# Patient Record
Sex: MALE | Race: ASIAN | NOT HISPANIC OR LATINO | URBAN - METROPOLITAN AREA
[De-identification: names, ages, dates, MRNs, and addresses within clinical notes are randomized per-mention and may not be internally consistent; named-entity substitution may affect disease eponyms.]

---

## 2017-09-26 ENCOUNTER — OFFICE VISIT (OUTPATIENT)
Dept: FAMILY MEDICINE | Facility: CLINIC | Age: 47
End: 2017-09-26

## 2017-09-26 VITALS
TEMPERATURE: 98.5 F | HEART RATE: 74 BPM | BODY MASS INDEX: 25.13 KG/M2 | OXYGEN SATURATION: 99 % | DIASTOLIC BLOOD PRESSURE: 74 MMHG | WEIGHT: 176.4 LBS | SYSTOLIC BLOOD PRESSURE: 116 MMHG

## 2017-09-26 DIAGNOSIS — M77.12 LATERAL EPICONDYLITIS, LEFT ELBOW: Primary | ICD-10-CM

## 2017-09-26 PROCEDURE — 99213 OFFICE O/P EST LOW 20 MIN: CPT | Performed by: PHYSICIAN ASSISTANT

## 2017-09-26 NOTE — LETTER
September 26, 2017      Zurdo Navas  [address] [address]        To Whom It May Concern:    Zurdo Navas was seen in our clinic. He may return to work with the following restrictions starting today:     Lifting: limit to 15 lbs.  Twisting Wrist/Elbow - No more than 15x per hour    OK to return to work without restrictions starting October 3rd.      Sincerely,        BRYANT Stevens

## 2017-09-26 NOTE — NURSING NOTE
Zurdo is here for left elbow pain    Pre-Visit Screening :  Immunizations : up to date  Colon Screening : NA  Asthma Action Test/Plan : NA  PHQ9/GAD7 :  NA  Pulse - regular  My Chart - declines    CLASSIFICATION OF OVERWEIGHT AND OBESITY BY BMI                         Obesity Class           BMI(kg/m2)  Underweight                                    < 18.5  Normal                                         18.5-24.9  Overweight                                     25.0-29.9  OBESITY                     I                  30.0-34.9                              II                 35.0-39.9  EXTREME OBESITY             III                >40                             Patient's  BMI Body mass index is 25.13 kg/(m^2).  http://hin.nhlbi.nih.gov/menuplanner/menu.cgi  Questioned patient about current smoking habits.  Pt. quit smoking some time ago.

## 2017-09-26 NOTE — PROGRESS NOTES
SUBJECTIVE:                                                    Zurdo Navas is a 46 year old male who presents to clinic today for the following health issues:            Joint Pain    Onset:     Description:   Location: left elbow  Character: feels  sharp    Intensity: 8/10    Progression of Symptoms: intermittent    Accompanying Signs & Symptoms:  Other symptoms: none    History:   Previous similar pain: no       Precipitating factors:   Trauma or overuse: YES- painting this weekend    Alleviating factors:  Improved by: acetaminophen    Therapies Tried and outcome: APAP, BenGgillian      Pt is right handed        Labs reviewed in EPIC  BP Readings from Last 3 Encounters:   17 116/74   16 108/80   16 116/62    Wt Readings from Last 3 Encounters:   17 80 kg (176 lb 6.4 oz)   16 74.8 kg (165 lb)   16 72.7 kg (160 lb 3.2 oz)                  Patient Active Problem List   Diagnosis     Health Care Home     ACP (advance care planning)     Pure hypercholesterolemia     Past Surgical History:   Procedure Laterality Date     NO HISTORY OF SURGERY         Social History   Substance Use Topics     Smoking status: Former Smoker     Packs/day: 0.50     Years: 1.00     Quit date: 1/10/2006     Smokeless tobacco: Never Used     Alcohol use 0.6 oz/week     1 Standard drinks or equivalent per week     Family History   Problem Relation Age of Onset     Hypertension Mother      Hypertension Father       age 74         No current outpatient prescriptions on file.     Allergies   Allergen Reactions     No Known Allergies      Recent Labs   Lab Test  16   1028  01/14/15   1039  01/14/15   1029   LDL  119  146*   --    HDL  38*  44   --    TRIG  111  104   --    ALT   --    --   26   CR   --    --   1.04   GFRESTIMATED   --    --   86   POTASSIUM   --    --   4.3              OBJECTIVE:   /74 (BP Location: Right arm, Patient Position: Chair, Cuff Size: Adult Large)  Pulse 74  Temp  98.5  F (36.9  C) (Oral)  Wt 80 kg (176 lb 6.4 oz)  SpO2 99%  BMI 25.13 kg/m2   Body mass index is 25.13 kg/(m^2).       Inspection: no swelling, no ecchymosis, no olecranon bursa swelling, no distal bicep tendon defect  Tender: extensor muscle of forearm  Non-tender: lateral epicondyle, common extensor tendon, medial epicondyle, flexor muscle of forearm, supracondylar notch, olecranon bursa, distal bicep tendon and radial head/neck  Range of Motion: all normal  Strength: elbow strength full  Special tests: not done       ASSESSMENT/PLAN:                                                        ICD-10-CM    1. Lateral epicondylitis, left elbow M77.12        Exercises from [employer] provided to be completed 1-2 times daily, as tolerated.   Ibuprofen 600 mg qid with food.  Icing advised  Relative rest.  RTC if not improving for xray in 2-3 weeks      Demetria Wells PA-C  [facility]

## 2017-09-26 NOTE — MR AVS SNAPSHOT
After Visit Summary   9/26/2017    Zurdo Navas    MRN: [id number]           Patient Information     Date Of Birth          1970        Visit Information        Provider Department      9/26/2017 9:15 AM Demetria Wells PA [facility]        Today's Diagnoses     Lateral epicondylitis, left elbow    -  1       Follow-ups after your visit        Who to contact     If you have questions or need follow up information about today's clinic visit or your schedule please contact [facility] directly at [phone].  Normal or non-critical lab and imaging results will be communicated to you by MyChart, letter or phone within 4 business days after the clinic has received the results. If you do not hear from us within 7 days, please contact the clinic through MyChart or phone. If you have a critical or abnormal lab result, we will notify you by phone as soon as possible.  Submit refill requests through [employer] or call your pharmacy and they will forward the refill request to us. Please allow 3 business days for your refill to be completed.          Additional Information About Your Visit        Care EveryWhere ID     This is your Care EveryWhere ID. This could be used by other organizations to access your [facility] medical records  [id number]        Your Vitals Were     Pulse Temperature Pulse Oximetry BMI (Body Mass Index)          74 98.5  F (36.9  C) (Oral) 99% 25.13 kg/m2         Blood Pressure from Last 3 Encounters:   09/26/17 116/74   11/28/16 108/80   04/29/16 116/62    Weight from Last 3 Encounters:   09/26/17 80 kg (176 lb 6.4 oz)   11/28/16 74.8 kg (165 lb)   04/29/16 72.7 kg (160 lb 3.2 oz)              Today, you had the following     No orders found for display       Primary Care Provider Office Phone # Fax #    BRYANT Stevens [phone] [address]  [facility] [address]        Equal  Access to Services     [facility]: Hadii aad ku hadangellaashlie Morris, waadriannedemi pantoja, lisatripp morochoivonzeny koch. So [facility] [phone].    ATENCIÓN: Si habla español, tiene a gastelum disposición servicios gratuitos de asistencia lingüística. Llame al [phone].    We comply with applicable federal civil rights laws and [place] laws. We do not discriminate on the basis of race, color, national origin, age, disability sex, sexual orientation or gender identity.            Thank you!     Thank you for choosing [facility]  for your care. Our goal is always to provide you with excellent care. Hearing back from our patients is one way we can continue to improve our services. Please take a few minutes to complete the written survey that you may receive in the mail after your visit with us. Thank you!             Your Updated Medication List - Protect others around you: Learn how to safely use, store and throw away your medicines at www.disposemymeds.org.      Notice  As of 9/26/2017 12:47 PM    You have not been prescribed any medications.

## 2018-05-12 ENCOUNTER — APPOINTMENT (OUTPATIENT)
Dept: GENERAL RADIOLOGY | Facility: CLINIC | Age: 48
End: 2018-05-12
Attending: PHYSICIAN ASSISTANT
Payer: COMMERCIAL

## 2018-05-12 ENCOUNTER — HOSPITAL ENCOUNTER (EMERGENCY)
Facility: CLINIC | Age: 48
Discharge: HOME | End: 2018-05-12
Attending: PHYSICIAN ASSISTANT | Admitting: PHYSICIAN ASSISTANT
Payer: COMMERCIAL

## 2018-05-12 VITALS
BODY MASS INDEX: 25.05 KG/M2 | HEIGHT: 70 IN | SYSTOLIC BLOOD PRESSURE: 133 MMHG | TEMPERATURE: 97.9 F | WEIGHT: 175 LBS | DIASTOLIC BLOOD PRESSURE: 98 MMHG | RESPIRATION RATE: 16 BRPM | OXYGEN SATURATION: 99 %

## 2018-05-12 DIAGNOSIS — S61.217A LACERATION OF LEFT LITTLE FINGER WITHOUT FOREIGN BODY WITHOUT DAMAGE TO NAIL, INITIAL ENCOUNTER: ICD-10-CM

## 2018-05-12 PROCEDURE — 73140 X-RAY EXAM OF FINGER(S): CPT | Mod: LT

## 2018-05-12 PROCEDURE — 99283 EMERGENCY DEPT VISIT LOW MDM: CPT

## 2018-05-12 PROCEDURE — 12001 RPR S/N/AX/GEN/TRNK 2.5CM/<: CPT

## 2018-05-12 RX ORDER — LIDOCAINE HYDROCHLORIDE 10 MG/ML
INJECTION, SOLUTION INFILTRATION; PERINEURAL
Status: DISCONTINUED
Start: 2018-05-12 | End: 2018-05-12 | Stop reason: HOSPADM

## 2018-05-12 ASSESSMENT — ENCOUNTER SYMPTOMS
WOUND: 1
NUMBNESS: 1

## 2018-05-12 NOTE — DISCHARGE INSTRUCTIONS
Sutures out in 10 days.     Extremity Laceration: Stitches, Staples, or Tape  A laceration is a cut through the skin. If it is deep, it may require stitches or staples to close so it can heal. Minor cuts may be treated with surgical tape closures, or skin glue.  X-rays may be done if something may have entered the skin through the cut. You may also need a tetanus shot if you are not up to date on this vaccine.  Home care    Follow the healthcare provider s instructions on how to care for the cut.    Wash your hands with soap and warm water before and after caring for your wound. This is to help prevent infection.    Keep the wound clean and dry. If a bandage was applied and it becomes wet or dirty, replace it. Otherwise, leave it in place for the first 24 hours, then change it once a day or as directed.    If stitches or staples were used, clean the wound daily:  ? After removing the bandage, wash the area with soap and water. Use a wet cotton swab to loosen and remove any blood or crust that forms.  ? After cleaning, keep the wound clean and dry. Talk with your healthcare provider before putting any antibiotic ointment on the wound. Reapply the bandage.    You may remove the bandage to shower as usual after the first 24 hours, but don't soak the area in water (no swimming) until the stitches or staples are removed.    If surgical tape closures were used, keep the area clean and dry. If it becomes wet, blot it dry with a towel. Let the surgical tape fall off on its own.    The healthcare provider may prescribe an antibiotic cream or ointment to prevent infection. He or she may also prescribe an antibiotic pill. Don't stop taking this medicine until you have finished it all or the provider tells you to stop.    The provider may also prescribe medicine for pain. Follow the instructions for taking these medicines.    Don't do activities that may reopen your wound.  Follow-up care  Follow up with your healthcare  provider, or as advised. Most skin wounds heal within 10 days. But an infection may sometimes occur even with proper treatment. Check the wound daily for the signs of infection listed below. Stitches and staples should be removed within 7 to14 days. If surgical tape closures were used, you may remove them after 10 days if they have not fallen off by then.   When to seek medical advice  Call your healthcare provider right away if any of these occur:    Wound bleeding not controlled by direct pressure    Signs of infection, including increasing pain in the wound, increasing wound redness or swelling, or pus or bad odor coming from the wound    Fever of 100.4 F (38 C) or higher, or as directed by your healthcare provider    Stitches or staples come apart or fall out or surgical tape falls off before 7 days    Wound edges reopen    Wound changes colors    Numbness occurs around the wound     Decreased movement around the injured area  Date Last Reviewed: 7/1/2017 2000-2017 [employer]. [address] [place] [address], [place] [address]. All rights reserved. This information is not intended as a substitute for professional medical care. Always follow your healthcare professional's instructions.

## 2018-05-12 NOTE — ED TRIAGE NOTES
A&Ox4. ABC's intact. Pt c/o left 5th finger after he drilled into the side of it.  Denies pain. Sent from [facility].  States tetanus was 3 years ago.

## 2018-05-12 NOTE — ED AVS SNAPSHOT
[facility] Emergency Department    [address]    [facility] [address]    Phone:  [phone]    Fax:  [phone]                                       Zurdo Navas   MRN: [id number]    Department:  [facility] Emergency Department   Date of Visit:  5/12/2018           Patient Information     Date Of Birth          1970        Your diagnoses for this visit were:     Laceration of left little finger without foreign body without damage to nail, initial encounter        You were seen by Savannah Colorado PA-C.      Follow-up Information     Follow up with Demetria Wells PA In 10 days.    Specialty:  Family Practice    Why:  For suture removal, For wound re-check    Contact information:    [address] [facility] [address]  [phone]          Follow up with [facility].    Specialty:  EMERGENCY MEDICINE    Why:  If symptoms worsen    Contact information:    [facility] [address] [phone]        Discharge Instructions       Sutures out in 10 days.     Extremity Laceration: Stitches, Staples, or Tape  A laceration is a cut through the skin. If it is deep, it may require stitches or staples to close so it can heal. Minor cuts may be treated with surgical tape closures, or skin glue.  X-rays may be done if something may have entered the skin through the cut. You may also need a tetanus shot if you are not up to date on this vaccine.  Home care    Follow the healthcare provider s instructions on how to care for the cut.    Wash your hands with soap and warm water before and after caring for your wound. This is to help prevent infection.    Keep the wound clean and dry. If a bandage was applied and it becomes wet or dirty, replace it. Otherwise, leave it in place for the first 24 hours, then change it once a day or as directed.    If stitches or staples were used, clean the  wound daily:  ? After removing the bandage, wash the area with soap and water. Use a wet cotton swab to loosen and remove any blood or crust that forms.  ? After cleaning, keep the wound clean and dry. Talk with your healthcare provider before putting any antibiotic ointment on the wound. Reapply the bandage.    You may remove the bandage to shower as usual after the first 24 hours, but don't soak the area in water (no swimming) until the stitches or staples are removed.    If surgical tape closures were used, keep the area clean and dry. If it becomes wet, blot it dry with a towel. Let the surgical tape fall off on its own.    The healthcare provider may prescribe an antibiotic cream or ointment to prevent infection. He or she may also prescribe an antibiotic pill. Don't stop taking this medicine until you have finished it all or the provider tells you to stop.    The provider may also prescribe medicine for pain. Follow the instructions for taking these medicines.    Don't do activities that may reopen your wound.  Follow-up care  Follow up with your healthcare provider, or as advised. Most skin wounds heal within 10 days. But an infection may sometimes occur even with proper treatment. Check the wound daily for the signs of infection listed below. Stitches and staples should be removed within 7 to14 days. If surgical tape closures were used, you may remove them after 10 days if they have not fallen off by then.   When to seek medical advice  Call your healthcare provider right away if any of these occur:    Wound bleeding not controlled by direct pressure    Signs of infection, including increasing pain in the wound, increasing wound redness or swelling, or pus or bad odor coming from the wound    Fever of 100.4 F (38 C) or higher, or as directed by your healthcare provider    Stitches or staples come apart or fall out or surgical tape falls off before 7 days    Wound edges reopen    Wound changes  colors    Numbness occurs around the wound     Decreased movement around the injured area  Date Last Reviewed: 7/1/2017 2000-2017 [employer]. [address] [place] [address], [place] [address]. All rights reserved. This information is not intended as a substitute for professional medical care. Always follow your healthcare professional's instructions.          24 Hour Appointment Hotline       To make an appointment at any [facility], call [phone] ([phone]). If you don't have a family doctor or clinic, we will help you find one. [facility] are conveniently located to serve the needs of you and your family.             Review of your medicines      Notice     You have not been prescribed any medications.            Procedures and tests performed during your visit     Fingers XR, 2-3 views, left      Orders Needing Specimen Collection     None      Pending Results     Date and Time Order Name Status Description    5/12/2018 1721 Fingers XR, 2-3 views, left Preliminary             Pending Culture Results     No orders found from 5/10/2018 to 5/13/2018.            Pending Results Instructions     If you had any lab results that were not finalized at the time of your Discharge, you can call the ED Lab Result RN at [phone]. You will be contacted by this team for any positive Lab results or changes in treatment. The nurses are available 7 days a week from 10A to 6:30P.  You can leave a message 24 hours per day and they will return your call.        Test Results From Your Hospital Stay        5/12/2018  6:09 PM      Narrative     LEFT FINGER TWO OR MORE VIEWS  5/12/2018 6:03 PM     HISTORY: Battery powered drill to pinky finger, rule out fracture.         Impression     IMPRESSION: Fifth finger soft tissue laceration and soft tissue gas.  No radiopaque foreign body. No apparent fracture or osseous  destruction.                 Clinical Quality Measure: Blood Pressure Screening     Your  "blood pressure was checked while you were in the emergency department today. The last reading we obtained was  BP: (!) 133/98 . Please read the guidelines below about what these numbers mean and what you should do about them.  If your systolic blood pressure (the top number) is less than 120 and your diastolic blood pressure (the bottom number) is less than 80, then your blood pressure is normal. There is nothing more that you need to do about it.  If your systolic blood pressure (the top number) is 120-139 or your diastolic blood pressure (the bottom number) is 80-89, your blood pressure may be higher than it should be. You should have your blood pressure rechecked within a year by a primary care provider.  If your systolic blood pressure (the top number) is 140 or greater or your diastolic blood pressure (the bottom number) is 90 or greater, you may have high blood pressure. High blood pressure is treatable, but if left untreated over time it can put you at risk for heart attack, stroke, or kidney failure. You should have your blood pressure rechecked by a primary care provider within the next 4 weeks.  If your provider in the emergency department today gave you specific instructions to follow-up with your doctor or provider even sooner than that, you should follow that instruction and not wait for up to 4 weeks for your follow-up visit.        Thank you for choosing [facility]       Thank you for choosing [facility] for your care. Our goal is always to provide you with excellent care. Hearing back from our patients is one way we can continue to improve our services. Please take a few minutes to complete the written survey that you may receive in the mail after you visit with us. Thank you!        [employer] Information     [employer] lets you send messages to your doctor, view your test results, renew your prescriptions, schedule appointments and more. To sign up, go to [email or web address] . Click on \"Log in\" on the " "left side of the screen, which will take you to the Welcome page. Then click on \"Sign up Now\" on the right side of the page.     You will be asked to enter the access code listed below, as well as some personal information. Please follow the directions to create your username and password.     Your access code is: [email or web address]  Expires: 8/10/2018  6:46 PM     Your access code will  in 90 days. If you need help or a new code, please call your [facility] clinic or [phone].        Care EveryWhere ID     This is your Care EveryWhere ID. This could be used by other organizations to access your [facility] medical records  [id number]        Equal Access to Services     [facility] : Chica Morris, bart pantoja, lokesh steiner, zeny hernandez. So [facility] [phone].    ATENCIÓN: Si habla español, tiene a gastelum disposición servicios gratuitos de asistencia lingüística. Llame al [phone].    We comply with applicable federal civil rights laws and [place] laws. We do not discriminate on the basis of race, color, national origin, age, disability, sex, sexual orientation, or gender identity.            After Visit Summary       This is your record. Keep this with you and show to your community pharmacist(s) and doctor(s) at your next visit.                  "

## 2018-05-12 NOTE — ED AVS SNAPSHOT
[facility] Emergency Department    [address]    [facility] [address]    Phone:  [phone]    Fax:  [phone]                                       Zurdo Navas   MRN: [id number]    Department:  [facility] Emergency Department   Date of Visit:  5/12/2018           After Visit Summary Signature Page     I have received my discharge instructions, and my questions have been answered. I have discussed any challenges I see with this plan with the nurse or doctor.    ..........................................................................................................................................  Patient/Patient Representative Signature      ..........................................................................................................................................  Patient Representative Print Name and Relationship to Patient    ..................................................               ................................................  Date                                            Time    ..........................................................................................................................................  Reviewed by Signature/Title    ...................................................              ..............................................  Date                                                            Time

## 2018-05-12 NOTE — ED PROVIDER NOTES
"  History     Chief Complaint:  Laceration    HPI   Zurdo Navas is a 47 year old male with a history of hyperlipidemia who presents with a laceration. The patient reports he was using a drill to drill through a piece of wood and accidentally drilled into the side of his left pinky finger. He states it was bleeding a lot and he had significant pain, so he went to urgent care for evaluation and was subsequently sent to the ED. He reports he can bend his finger but does have some mild numbness. He denies any other injury. His tetanus vaccine is up to date.     Allergies:  No known drug allergies     Medications:    The patient is currently on no regular medications.     Past Medical History:    Hyperlipidemia    Past Surgical History:    History reviewed. No pertinent surgical history.     Family History:    Hypertension     Social History:  Smoking status: Former smoker, quit 2006  Alcohol use: Yes   Marital Status:   [4]     Review of Systems   Skin: Positive for wound (left pinky finger laceration).   Neurological: Positive for numbness.   All other systems reviewed and are negative.    Physical Exam     Patient Vitals for the past 24 hrs:   BP Temp Temp src Heart Rate Resp SpO2 Height Weight   05/12/18 1710 (!) 133/98 97.9  F (36.6  C) Temporal 82 16 99 % 1.778 m (5' 10\") 79.4 kg (175 lb)      Physical Exam  Constitutional:  Alert, attentive  HENT:    Nose: Nose normal.    Mouth/Throat: Oropharynx is clear, mucous membranes are moist  Eyes:    EOM are normal. Pupils are equal, round, and reactive to light.   CV:    regular rate and rhythm; no murmurs, rubs or gallups. 2+ radial and ulnar pulses to the bilateral upper extremities   Chest:   Effort normal and breath sounds normal.   GI:     There is no tenderness. No distension. Normal bowel sounds  Neurological:  5/5 strength to the radian, ulnar and median motor distributions;      sensation intact to light touch to the radian, ulnar and median " distributions  MSK:   There is a 2 cm laceration to the left proximal 5th finger. Normal ROM of the fingers. No other injuries.   Skin:    Skin is warm and dry.      Emergency Department Course   Imaging:  Radiographic findings were communicated with the patient who voiced understanding of the findings.    Fingers XR, 2-3 Views, Left:  Fifth finger soft tissue laceration and soft tissue gas.  No radiopaque foreign body. No apparent fracture or osseous  destruction.   As read by Radiology.     Procedures:    Narrative: Procedure: Laceration Repair      PERFORMED BY: Nati Ontiveros PA-C        LACERATION:  A simple clean 2 cm laceration.      LOCATION:  Left 5th finger      FUNCTION:  Distally sensation, circulation, motor and tendon function are intact.      ANESTHESIA:  Local using 1% lidocaine total of 4 mLs      PREPARATION:  Irrigation and Scrubbing with Shur Clens      DEBRIDEMENT:  no debridement      CLOSURE:  Wound was closed with One Layer.  Skin closed with 5 x 4.0 Ethylon using interrupted sutures.     Emergency Department Course:  Past medical records, nursing notes, and vitals reviewed.  1716: I performed an exam of the patient and obtained history, as documented above.   The patient was sent for a left finger x-ray while in the emergency department, findings above.     1815: Nati Ontiveros PA-C repaired the patient's laceration, as noted above.     1820: I rechecked the patient. Findings and plan explained to the Patient. Patient discharged home with instructions regarding supportive care, medications, and reasons to return. The importance of close follow-up was reviewed.      Impression & Plan    Medical Decision Making:  Zurdo Navas is a 47 year old male who presents for evaluation of a complex finger laceration after drilling into the side of his left th finger.  X-ray reveals no fracture or foreign body. Tetanus up to date.  The finger wound was closed, as noted above.  There was no exposed  bone/tendon/joint after laceration repair. There is no signs at this point of tendon injury.  Sensation is intact distally in that finger.  Bacitracin was applied and dressing change instructions given to patient.  No other injury. Sutures out in 10 days.      Diagnosis:    ICD-10-CM   1. Laceration of left little finger without foreign body without damage to nail, initial encounter S61.217A     Disposition:  discharged to home    Viry Bey  5/12/2018   [facility] EMERGENCY DEPARTMENT    I, Viry Bey, am serving as a scribe at 5:16 PM on 5/12/2018 to document services personally performed by Savannah Colorado PA-C based on my observations and the provider's statements to me.       Savannah Colorado PA-C  05/12/18 2011

## 2018-05-13 NOTE — ED PROVIDER NOTES
I, Nati Ontiveros PA-C, interviewed the patient, explained the course of action and discussed the patient with Savannah Colorado PA-C, who then evaluated the patient.    I performed the below laceration repair on this patient.       Narrative: Procedure: Laceration Repair        LACERATION:  A simple, semicircular, minimally contaminated 2 cm laceration.      LOCATION: Left proximal fifth digit      FUNCTION:  Distally sensation, circulation, motor and tendon function are intact.      ANESTHESIA:  Local using 1% lidocaine, total of 4 mLs      PREPARATION:  Irrigation and Scrubbing with Normal Saline and Shur Clens      DEBRIDEMENT:  Minimal debridement      CLOSURE:  Wound was closed with One Layer. Skin closed with 5 x 4.0 Ethylon using interrupted sutures.         Nati Ontiveros PA-C  05/12/18 2006

## 2018-05-25 ENCOUNTER — OFFICE VISIT (OUTPATIENT)
Dept: FAMILY MEDICINE | Facility: CLINIC | Age: 48
End: 2018-05-25

## 2018-05-25 VITALS
WEIGHT: 180.8 LBS | DIASTOLIC BLOOD PRESSURE: 88 MMHG | TEMPERATURE: 97.9 F | HEART RATE: 79 BPM | BODY MASS INDEX: 25.88 KG/M2 | HEIGHT: 70 IN | OXYGEN SATURATION: 96 % | SYSTOLIC BLOOD PRESSURE: 128 MMHG

## 2018-05-25 DIAGNOSIS — S61.217D LACERATION OF LEFT LITTLE FINGER WITHOUT FOREIGN BODY WITHOUT DAMAGE TO NAIL, SUBSEQUENT ENCOUNTER: Primary | ICD-10-CM

## 2018-05-25 DIAGNOSIS — Z48.02 ENCOUNTER FOR REMOVAL OF SUTURES: ICD-10-CM

## 2018-05-25 PROBLEM — S61.217A LACERATION OF LEFT LITTLE FINGER WITHOUT FOREIGN BODY WITHOUT DAMAGE TO NAIL: Status: ACTIVE | Noted: 2018-05-25

## 2018-05-25 PROBLEM — S61.217A LACERATION OF LEFT LITTLE FINGER WITHOUT FOREIGN BODY WITHOUT DAMAGE TO NAIL: Status: RESOLVED | Noted: 2018-05-25 | Resolved: 2018-05-25

## 2018-05-25 PROCEDURE — 99213 OFFICE O/P EST LOW 20 MIN: CPT | Performed by: PHYSICIAN ASSISTANT

## 2018-05-25 NOTE — MR AVS SNAPSHOT
After Visit Summary   5/25/2018    Zurdo Navas    MRN: [id number]           Patient Information     Date Of Birth          1970        Visit Information        Provider Department      5/25/2018 9:15 AM Demetria Wells PA [facility]        Today's Diagnoses     Laceration of left little finger without foreign body without damage to nail, subsequent encounter    -  1    Encounter for removal of sutures           Follow-ups after your visit        Additional Services     ORTHOPEDICS ADULT REFERRAL       Your provider has referred you to: [facility] [phone]   [email or web address]    Please be aware that coverage of these services is subject to the terms and limitations of your health insurance plan.  Call member services at your health plan with any benefit or coverage questions.      Please bring the following to your appointment:    >>   Any x-rays, CTs or MRIs which have been performed.  Contact the facility where they were done to arrange for  prior to your scheduled appointment.  Any new CT, MRI or other procedures ordered by your specialist must be performed at a [facility] or coordinated by your clinic's referral office.    >>   List of current medications   >>   This referral request   >>   Any documents/labs given to you for this referral                  Who to contact     If you have questions or need follow up information about today's clinic visit or your schedule please contact [facility] directly at [phone].  Normal or non-critical lab and imaging results will be communicated to you by MyChart, letter or phone within 4 business days after the clinic has received the results. If you do not hear from us within 7 days, please contact the clinic through MyChart or phone. If you have a critical or abnormal lab result, we will notify you by phone as soon  "as possible.  Submit refill requests through [employer] or call your pharmacy and they will forward the refill request to us. Please allow 3 business days for your refill to be completed.          Additional Information About Your Visit        [employer] Information     [employer] lets you send messages to your doctor, view your test results, renew your prescriptions, schedule appointments and more. To sign up, go to [email or web address] . Click on \"Log in\" on the left side of the screen, which will take you to the Welcome page. Then click on \"Sign up Now\" on the right side of the page.     You will be asked to enter the access code listed below, as well as some personal information. Please follow the directions to create your username and password.     Your access code is: [email or web address]  Expires: 8/10/2018  6:46 PM     Your access code will  in 90 days. If you need help or a new code, please call your [place] clinic or [phone].        Care EveryWhere ID     This is your Care EveryWhere ID. This could be used by other organizations to access your [place] medical records  [id number]        Your Vitals Were     Pulse Temperature Height Pulse Oximetry BMI (Body Mass Index)       79 97.9  F (36.6  C) (Oral) 1.778 m (5' 10\") 96% 25.94 kg/m2        Blood Pressure from Last 3 Encounters:   18 128/88   18 (!) 133/98   17 116/74    Weight from Last 3 Encounters:   18 82 kg (180 lb 12.8 oz)   18 79.4 kg (175 lb)   17 80 kg (176 lb 6.4 oz)              We Performed the Following     ORTHOPEDICS ADULT REFERRAL        Primary Care Provider Office Phone # Fax #    BRYANT Stevens [phone] [address]  [facility] [address]        Equal Access to Services     KRYS ROBERTO AH: Chica Morris, wamehrdad pantoja, zeny ulloa ah. [facility] [phone].    ATENCIÓN: Si magali smith, " tiene a gastelum disposición servicios gratuitos de asistencia lingüística. Nani hdz [phone].    We comply with applicable federal civil rights laws and [place] laws. We do not discriminate on the basis of race, color, national origin, age, disability, sex, sexual orientation, or gender identity.            Thank you!     Thank you for choosing [facility]  for your care. Our goal is always to provide you with excellent care. Hearing back from our patients is one way we can continue to improve our services. Please take a few minutes to complete the written survey that you may receive in the mail after your visit with us. Thank you!             Your Updated Medication List - Protect others around you: Learn how to safely use, store and throw away your medicines at www.disposemymeds.org.      Notice  As of 5/25/2018 11:59 PM    You have not been prescribed any medications.

## 2018-05-25 NOTE — PROGRESS NOTES
"Suture removal  Chief Complaint   Patient presents with     Suture Removal     pt has sutures in left 5th digit to be removed       Subjective:  Zurdo Navas who presents for suture removal.  Sitches were placed on 5/12/18 following trauma.  There have not been any problems with the suture site.    Pt is right handed      Today I had a PA student with me Hipolito Mccarthy who participated in history and PE of the patient, patient agreeable to this.     Objective:  /88 (BP Location: Left arm, Patient Position: Chair, Cuff Size: Adult Large)  Pulse 79  Temp 97.9  F (36.6  C) (Oral)  Ht 1.778 m (5' 10\")  Wt 82 kg (180 lb 12.8 oz)  SpO2 96%  BMI 25.94 kg/m2     There are 5 sutures in the left hand.  The site appears well healed.  The stitches were removed without difficulty. (PA student removed 2 sutures and I removed the other 3).  Steri-strips were not applied at this time.    ROM of the finger decreased slightly    Assessment:  Suture Removal    Plan  1. Apply anti-bacterial ointment for 7 days  2. RTC for any problems/concerns otherwise on a PRN basis    See ortho of ROM not improving 1-2 weeks    Demetria Wells    "

## 2018-05-25 NOTE — NURSING NOTE
Zurdo is here for a suture removal of his left 5th digit    Pre-Visit Screening :  Immunizations : up to date  Colon Screening : randolph  Asthma Action Test/Plan : randolph  PHQ9/GAD7 :  Na    Pulse - regular  My Chart - declines    CLASSIFICATION OF OVERWEIGHT AND OBESITY BY BMI                         Obesity Class           BMI(kg/m2)  Underweight                                    < 18.5  Normal                                         18.5-24.9  Overweight                                     25.0-29.9  OBESITY                     I                  30.0-34.9                              II                 35.0-39.9  EXTREME OBESITY             III                >40                             Patient's  BMI Body mass index is 22.15 kg/(m^2).  http://hin.nhlbi.nih.gov/menuplanner/menu.cgi  Questioned patient about current smoking habits.  Pt. has never smoked.  The patient has verbalized that it is ok to leave a detailed voice message on the patient's cell phone with results/recommendations from this visit.       Verified [phone] phone number:

## 2018-08-13 ENCOUNTER — OFFICE VISIT (OUTPATIENT)
Dept: FAMILY MEDICINE | Facility: CLINIC | Age: 48
End: 2018-08-13

## 2018-08-13 VITALS
HEART RATE: 72 BPM | BODY MASS INDEX: 26.05 KG/M2 | OXYGEN SATURATION: 98 % | WEIGHT: 182 LBS | DIASTOLIC BLOOD PRESSURE: 82 MMHG | HEIGHT: 70 IN | TEMPERATURE: 98 F | SYSTOLIC BLOOD PRESSURE: 114 MMHG

## 2018-08-13 DIAGNOSIS — Z00.01 ENCOUNTER FOR ROUTINE ADULT HEALTH EXAMINATION WITH ABNORMAL FINDINGS: Primary | ICD-10-CM

## 2018-08-13 DIAGNOSIS — E66.3 OVERWEIGHT: ICD-10-CM

## 2018-08-13 DIAGNOSIS — L30.9 DERMATITIS: ICD-10-CM

## 2018-08-13 LAB — GLUCOSE SERPL-MCNC: 100 MG/DL (ref 60–99)

## 2018-08-13 PROCEDURE — 36415 COLL VENOUS BLD VENIPUNCTURE: CPT | Performed by: PHYSICIAN ASSISTANT

## 2018-08-13 PROCEDURE — 80061 LIPID PANEL: CPT | Mod: 90 | Performed by: PHYSICIAN ASSISTANT

## 2018-08-13 PROCEDURE — 82947 ASSAY GLUCOSE BLOOD QUANT: CPT | Performed by: PHYSICIAN ASSISTANT

## 2018-08-13 PROCEDURE — 99396 PREV VISIT EST AGE 40-64: CPT | Performed by: PHYSICIAN ASSISTANT

## 2018-08-13 RX ORDER — HYDROCORTISONE VALERATE 2 MG/G
OINTMENT TOPICAL
Qty: 45 G | Refills: 0 | Status: SHIPPED | OUTPATIENT
Start: 2018-08-13 | End: 2020-07-10

## 2018-08-13 NOTE — PROGRESS NOTES
SUBJECTIVE:   CC: Zurdo Navas is an 47 year old male who presents for preventative health visit.     Healthy Habits:    Do you get at least three servings of calcium containing foods daily (dairy, green leafy vegetables, etc.)? yes    Amount of exercise or daily activities, outside of work: none    Problems taking medications regularly No    Medication side effects: No    Have you had an eye exam in the past two years? yes    Do you see a dentist twice per year? yes    Do you have sleep apnea, excessive snoring or daytime drowsiness?no      Wt Readings from Last 5 Encounters:   08/13/18 82.6 kg (182 lb)   05/25/18 82 kg (180 lb 12.8 oz)   05/12/18 79.4 kg (175 lb)   09/26/17 80 kg (176 lb 6.4 oz)   11/28/16 74.8 kg (165 lb)     Rash - developed 3 days ago  New soap  andNew laundry detergent as well  in the last week.  Using a friends Momematsone which has helped    -------------------------------------    Today's PHQ-2 Score:   PHQ-2 ( 1999 Pfizer) 8/13/2018 1/14/2015   Q1: Little interest or pleasure in doing things 0 0   Q2: Feeling down, depressed or hopeless 0 0   PHQ-2 Score 0 0       Abuse: Current or Past(Physical, Sexual or Emotional)- No  Do you feel safe in your environment - Yes    Social History   Substance Use Topics     Smoking status: Former Smoker     Packs/day: 0.50     Years: 1.00     Quit date: 1/10/2006     Smokeless tobacco: Never Used     Alcohol use 0.6 oz/week     1 Standard drinks or equivalent per week      Comment: occasional      If you drink alcohol do you typically have >3 drinks per day or >7 drinks per week? No                      Last PSA: No results found for: PSA    Reviewed orders with patient. Reviewed health maintenance and updated orders accordingly - Yes  Labs reviewed in EPIC  BP Readings from Last 3 Encounters:   08/13/18 114/82   05/25/18 128/88   05/12/18 (!) 133/98    Wt Readings from Last 3 Encounters:   08/13/18 82.6 kg (182 lb)   05/25/18 82 kg (180 lb 12.8 oz)    18 79.4 kg (175 lb)                  Patient Active Problem List   Diagnosis     Health Care Home     ACP (advance care planning)     Pure hypercholesterolemia     Past Surgical History:   Procedure Laterality Date     NO HISTORY OF SURGERY         Social History   Substance Use Topics     Smoking status: Former Smoker     Packs/day: 0.50     Years: 1.00     Quit date: 1/10/2006     Smokeless tobacco: Never Used     Alcohol use 0.6 oz/week     1 Standard drinks or equivalent per week     Family History   Problem Relation Age of Onset     Hypertension Mother      Hypertension Father       age 74     Other - See Comments Daughter      cervix hx         Current Outpatient Prescriptions   Medication Sig Dispense Refill     hydrocortisone valerate (WEST-SAWYER) 0.2 % ointment Apply sparingly to affected area three times daily as needed. 45 g 0     Allergies   Allergen Reactions     No Known Allergies        Reviewed and updated as needed this visit by clinical staff  Tobacco  Allergies  Problems         Reviewed and updated as needed this visit by Provider  Problems        No past medical history on file.   Past Surgical History:   Procedure Laterality Date     NO HISTORY OF SURGERY         ROS:  CONSTITUTIONAL: NEGATIVE for fever, chills, change in weight  INTEGUMENTARY/SKIN: See rash info above  EYES: NEGATIVE for vision changes or irritation  ENT: NEGATIVE for ear, mouth and throat problems  RESP: NEGATIVE for significant cough or SOB  CV: NEGATIVE for chest pain, palpitations or peripheral edema  GI: NEGATIVE for nausea, abdominal pain, heartburn, or change in bowel habits   male: negative for dysuria, hematuria, decreased urinary stream, erectile dysfunction, urethral discharge  MUSCULOSKELETAL: NEGATIVE for significant arthralgias or myalgia  NEURO: NEGATIVE for weakness, dizziness or paresthesias  PSYCHIATRIC: NEGATIVE for changes in mood or affect    OBJECTIVE:   /82 (BP Location: Right arm,  "Patient Position: Sitting, Cuff Size: Adult Large)  Pulse 72  Temp 98  F (36.7  C) (Oral)  Ht 1.778 m (5' 10\")  Wt 82.6 kg (182 lb)  SpO2 98%  BMI 26.11 kg/m2    EXAM:  GENERAL: healthy, alert and no distress  EYES: Eyes grossly normal to inspection, PERRL and conjunctivae and sclerae normal  HENT: ear canals and TM's normal, nose and mouth without ulcers or lesions  NECK: no adenopathy, no asymmetry, masses, or scars and thyroid normal to palpation  RESP: lungs clear to auscultation - no rales, rhonchi or wheezes  CV: regular rate and rhythm, normal S1 S2, no S3 or S4, no murmur, click or rub, no peripheral edema and peripheral pulses strong  ABDOMEN: soft, nontender, no hepatosplenomegaly, no masses and bowel sounds normal   (male): normal male genitalia without lesions or urethral discharge, no hernia  MS: no gross musculoskeletal defects noted, no edema  SKIN: papular rash on lower back and lower legs- discrete 2 mm papules with erythematous base  NEURO: Normal strength and tone, mentation intact and speech normal  PSYCH: mentation appears normal, affect normal/bright    Diagnostic Test Results:  none     ASSESSMENT/PLAN:   (Z00.01) Encounter for routine adult health examination with abnormal findings  (primary encounter diagnosis)  Comment: annual  Plan: VENOUS COLLECTION, Lipid Profile, Glucose         Fasting (BFP)            (L30.9) Dermatitis  Comment: new  Plan: hydrocortisone valerate (WEST-SAWYER) 0.2 %         ointment        Start mid potency steroid tid up to 14 days  Zyrtec and Benadryl nightly    OVERWEIGHT      COUNSELING:  Special attention given to:        Regular exercise       Healthy diet/nutrition       Vision screening    BP Readings from Last 1 Encounters:   08/13/18 114/82     Estimated body mass index is 26.11 kg/(m^2) as calculated from the following:    Height as of this encounter: 1.778 m (5' 10\").    Weight as of this encounter: 82.6 kg (182 lb).      Weight management plan: " Discussed healthy diet and exercise guidelines and patient will follow up in 12 months in clinic to re-evaluate.     reports that he quit smoking about 12 years ago. He has a 0.50 pack-year smoking history. He has never used smokeless tobacco.      Counseling Resources:  ATP IV Guidelines  Pooled Cohorts Equation Calculator  FRAX Risk Assessment  ICSI Preventive Guidelines  Dietary Guidelines for Americans, 2010  USDA's MyPlate  ASA Prophylaxis  Lung CA Screening    BRYANT Stevens  [facility]

## 2018-08-13 NOTE — NURSING NOTE
Zurdo is here for CPX fasting      Patient is here for a full physical exam.    Pre-Visit Screening :  Immunizations : up to date  Colon Screening : NA  Mammogram: NA  Asthma Action Test/Plan : NA  PHQ9 :  none  GAD7 :  none  Patient's  BMI Body mass index is 26.11 kg/(m^2).  Questioned patient about current smoking habits.  Pt. quit smoking some time ago.  OK to leave a detailed voice message regarding today's visit Yes, phone # [phone]      ETOH screening:  Questions:  1-How often do you have a drink containing alcohol?                             3 times per year  2-How many drinks containing alcohol do you have on a typical day when you are         Drinking?                              3   3- How often do you have 5 or more drinks on one occasion?                              1 per year

## 2018-08-13 NOTE — MR AVS SNAPSHOT
After Visit Summary   8/13/2018    Zurdo Navas    MRN: [id number]           Patient Information     Date Of Birth          1970        Visit Information        Provider Department      8/13/2018 10:30 AM Demetria Wells PA [facility]        Today's Diagnoses     Encounter for routine adult health examination with abnormal findings    -  1    Dermatitis        Overweight          Care Instructions      Preventive Health Recommendations  Male Ages 40 to 49    Yearly exam:             See your health care provider every year in order to  o   Review health changes.   o   Discuss preventive care.    o   Review your medicines if your doctor has prescribed any.    You should be tested each year for STDs (sexually transmitted diseases) if you re at risk.     Have a cholesterol test every 5 years.     Have a colonoscopy (test for colon cancer) if someone in your family has had colon cancer or polyps before age 50.     After age 45, have a diabetes test (fasting glucose). If you are at risk for diabetes, you should have this test every 3 years.      Talk with your health care provider about whether or not a prostate cancer screening test (PSA) is right for you.    Shots: Get a flu shot each year. Get a tetanus shot every 10 years.     Nutrition:    Eat at least 5 servings of fruits and vegetables daily.     Eat whole-grain bread, whole-wheat pasta and brown rice instead of white grains and rice.     Get adequate Calcium and Vitamin D.     Lifestyle    Exercise for at least 150 minutes a week (30 minutes a day, 5 days a week). This will help you control your weight and prevent disease.     Limit alcohol to one drink per day.     No smoking.     Wear sunscreen to prevent skin cancer.     See your dentist every six months for an exam and cleaning.              Follow-ups after your visit        Who to contact     If you have questions or need follow up information about  "today's clinic visit or your schedule please contact [facility] directly at [phone].  Normal or non-critical lab and imaging results will be communicated to you by MyChart, letter or phone within 4 business days after the clinic has received the results. If you do not hear from us within 7 days, please contact the clinic through MyChart or phone. If you have a critical or abnormal lab result, we will notify you by phone as soon as possible.  Submit refill requests through [employer] or call your pharmacy and they will forward the refill request to us. Please allow 3 business days for your refill to be completed.          Additional Information About Your Visit        Care EveryWhere ID     This is your Care EveryWhere ID. This could be used by other organizations to access your [place] medical records  [id number]        Your Vitals Were     Pulse Temperature Height Pulse Oximetry BMI (Body Mass Index)       72 98  F (36.7  C) (Oral) 1.778 m (5' 10\") 98% 26.11 kg/m2        Blood Pressure from Last 3 Encounters:   08/13/18 114/82   05/25/18 128/88   05/12/18 (!) 133/98    Weight from Last 3 Encounters:   08/13/18 82.6 kg (182 lb)   05/25/18 82 kg (180 lb 12.8 oz)   05/12/18 79.4 kg (175 lb)              We Performed the Following     Glucose Fasting (BFP)     Lipid Profile     VENOUS COLLECTION          Today's Medication Changes          These changes are accurate as of 8/13/18  6:06 PM.  If you have any questions, ask your nurse or doctor.               Start taking these medicines.        Dose/Directions    hydrocortisone valerate 0.2 % ointment   Commonly known as:  WEST-SAWYER   Used for:  Dermatitis   Started by:  Demetria Wells PA        Apply sparingly to affected area three times daily as needed.   Quantity:  45 g   Refills:  0            Where to get your medicines      These medications were sent to [facility] [address] [place] [address] [facility] [address], [facility] [address]     Phone:  [phone]     hydrocortisone valerate 0.2 % ointment                Primary Care Provider Office Phone # Fax #    BRYANT Stevens [phone] [address]  [facility] [address]        Equal Access to Services     [facility]: Hadii aad ku hadasho Soomaali, waaxda luqadaha, qaybta kaalmada adeegyada, waxay idiin hayaan adeeg kharash lawoo . So [facility] [phone].    ATENCIÓN: Si habla español, tiene a gastelum disposición servicios gratuitos de asistencia lingüística. Nani al [phone].    We comply with applicable federal civil rights laws and [place] laws. We do not discriminate on the basis of race, color, national origin, age, disability, sex, sexual orientation, or gender identity.            Thank you!     Thank you for choosing [facility]  for your care. Our goal is always to provide you with excellent care. Hearing back from our patients is one way we can continue to improve our services. Please take a few minutes to complete the written survey that you may receive in the mail after your visit with us. Thank you!             Your Updated Medication List - Protect others around you: Learn how to safely use, store and throw away your medicines at www.disposemymeds.org.          This list is accurate as of 8/13/18  6:06 PM.  Always use your most recent med list.                   Brand Name Dispense Instructions for use Diagnosis    hydrocortisone valerate 0.2 % ointment    WEST-SAWYER    45 g    Apply sparingly to affected area three times daily as needed.    Dermatitis

## 2018-08-13 NOTE — LETTER
August 14, 2018      Zurdo Navas  [address] [place] [address]        Dear ,    We are writing to inform you of your test results.    Your HDL (the good cholesterol) is slightly low. The higher the number the better, the best way to increase this is with exercise. 30 minutes a day, 5 days a week is your gola.  I recommend starting an over the counter fish oil that contains EPA and DHA. The recommended dosage is between 250-500 mg per day.      Your fasting glucose was elevated. This is called Impaired fasting Glucose (IFG).    Fasting blood glucose of 100-125 indicates pre-diabetes or impaired fasting glucose (IFG). Your blood sugar is above normal but not high enough to be called diabetes. A fasting blood glucose of 126 or higher indicates diabetes. 25-40% of people with IFG progress to diabetes over time so you are at an increased risk of developing diabetes.    Pre-diabetes increases the risk for heart attack, kidney damage, arteriosclerosis in the legs, and stroke. Cutting back on calories, losing weight and being physically active can reverse pre-diabetes, delay type 2 diabetes, and delay all these complications.        Resulted Orders   Lipid Profile   Result Value Ref Range    Cholesterol 174 <200 mg/dL    HDL Cholesterol 38 (L) >40 mg/dL    Triglycerides 99 <150 mg/dL    LDL Cholesterol Calculated 115 (H) mg/dL (calc)      Comment:      Reference range: <100     Desirable range <100 mg/dL for primary prevention;    <70 mg/dL for patients with CHD or diabetic patients   with > or = 2 CHD risk factors.     LDL-C is now calculated using the Aj   calculation, which is a validated novel method providing   better accuracy than the Friedewald equation in the   estimation of LDL-C.   Shahzad MATT et al. NA. 2013;310(19): 0853-6800   (http://education.[employer]Patients Know Best/faq/MWM928)      Cholesterol/HDL Ratio 4.6 <5.0 (calc)    Non HDL Cholesterol 136 (H) <130 mg/dL (calc)       Comment:      For patients with diabetes plus 1 major ASCVD risk   factor, treating to a non-HDL-C goal of <100 mg/dL   (LDL-C of <70 mg/dL) is considered a therapeutic   option.     Glucose Fasting (BFP)   Result Value Ref Range    Glucose 100 (A) 60 - 99 mg/dL       If you have any questions or concerns, please call the clinic at the number listed above.       Sincerely,        BRYANT Stevens

## 2018-08-14 LAB
CHOLEST SERPL-MCNC: 174 MG/DL
CHOLEST/HDLC SERPL: 4.6 (CALC)
HDLC SERPL-MCNC: 38 MG/DL
LDLC SERPL CALC-MCNC: 115 MG/DL (CALC)
NONHDLC SERPL-MCNC: 136 MG/DL (CALC)
TRIGL SERPL-MCNC: 99 MG/DL

## 2019-01-10 ENCOUNTER — OFFICE VISIT (OUTPATIENT)
Dept: FAMILY MEDICINE | Facility: CLINIC | Age: 49
End: 2019-01-10

## 2019-01-10 VITALS
DIASTOLIC BLOOD PRESSURE: 76 MMHG | BODY MASS INDEX: 26.26 KG/M2 | TEMPERATURE: 98.2 F | WEIGHT: 183.4 LBS | HEIGHT: 70 IN | HEART RATE: 81 BPM | SYSTOLIC BLOOD PRESSURE: 120 MMHG | OXYGEN SATURATION: 98 %

## 2019-01-10 DIAGNOSIS — M54.42 ACUTE BILATERAL LOW BACK PAIN WITH LEFT-SIDED SCIATICA: Primary | ICD-10-CM

## 2019-01-10 PROCEDURE — 99213 OFFICE O/P EST LOW 20 MIN: CPT | Performed by: PHYSICIAN ASSISTANT

## 2019-01-10 ASSESSMENT — MIFFLIN-ST. JEOR: SCORE: 1703.15

## 2019-01-10 NOTE — PROGRESS NOTES
SUBJECTIVE:   Zurdo Navas is a 48 year old male who presents to clinic today for the following health issues:      Zurdo Is here for [employer] paperwork.  Missed work on Dec 9th due to back pain.  Just off that shift.    Zurdo has had intermittent back pain since at least  per records.    Pain is in the low back and comes and goes.    Sometimes pain will radiate down left leg.     Pt is asymptomatic since Dec.       -------------------------------------    Problem list and histories reviewed & adjusted, as indicated.  Additional history: as documented    Patient Active Problem List   Diagnosis     Health Care Home     ACP (advance care planning)     Overweight     Past Surgical History:   Procedure Laterality Date     NO HISTORY OF SURGERY         Social History     Tobacco Use     Smoking status: Former Smoker     Packs/day: 0.50     Years: 1.00     Pack years: 0.50     Last attempt to quit: 1/10/2006     Years since quittin.0     Smokeless tobacco: Never Used   Substance Use Topics     Alcohol use: Yes     Alcohol/week: 0.6 oz     Types: 1 Standard drinks or equivalent per week     Family History   Problem Relation Age of Onset     Hypertension Mother      Hypertension Father          age 74     Other - See Comments Daughter         cervix hx         Current Outpatient Medications   Medication Sig Dispense Refill     hydrocortisone valerate (WEST-SAWYER) 0.2 % ointment Apply sparingly to affected area three times daily as needed. 45 g 0     Allergies   Allergen Reactions     No Known Allergies      BP Readings from Last 3 Encounters:   01/10/19 120/76   18 114/82   18 128/88    Wt Readings from Last 3 Encounters:   01/10/19 83.2 kg (183 lb 6.4 oz)   18 82.6 kg (182 lb)   18 82 kg (180 lb 12.8 oz)                    Reviewed and updated as needed this visit by clinical staff  Tobacco  Allergies  Problems       Reviewed and updated as needed this visit by Provider      "    ROS:  Constitutional, HEENT, cardiovascular, pulmonary, gi and gu systems are negative, except as otherwise noted.    OBJECTIVE:     /76 (BP Location: Left arm, Patient Position: Sitting, Cuff Size: Adult Large)   Pulse 81   Temp 98.2  F (36.8  C) (Oral)   Ht 1.778 m (5' 10\")   Wt 83.2 kg (183 lb 6.4 oz)   SpO2 98%   BMI 26.32 kg/m    Body mass index is 26.32 kg/m .     Gait is normal.     Back:  Skin without ecchymosis, erythema or swelling.  Thoracic and lumbar spine non-tender to palpation.  SI Joints:  Right and left SI joints non-tender  ROM:  Normal flexion, extension, rotation, lateral bending  Strength - Full strength hip flexors, knee extensors/flexors and ankles  Normal sensation in legs to light tough bilaterally  SLR Negative bilaterally   Patellar reflexes normal bilaterally           ASSESSMENT/PLAN:     (M54.42) Acute bilateral low back pain with left-sided sciatica  (primary encounter diagnosis)  Comment: new  Plan:   [facility] paperwork signed, scanned in chart.  Gave home PT exercises to do 3x weekly  RTC if sx return as would advised formal PT program.         BRYANT Stevens  [facility]    "

## 2019-01-10 NOTE — NURSING NOTE
Zurdo is here today for forms to fill out for work.    Pre-visit Screening:  Immunizations:  up to date  Colonoscopy:  NA  Mammogram: NA  Asthma Action Test/Plan:  NA  PHQ9:  NA  GAD7:  NA  Questioned patient about current smoking habits Pt. has never smoked.  Ok to leave detailed message on voice mail for today's visit only Yes, phone # [phone]

## 2019-10-29 ENCOUNTER — OFFICE VISIT (OUTPATIENT)
Dept: FAMILY MEDICINE | Facility: CLINIC | Age: 49
End: 2019-10-29

## 2019-10-29 VITALS
DIASTOLIC BLOOD PRESSURE: 82 MMHG | HEIGHT: 70 IN | WEIGHT: 181.2 LBS | HEART RATE: 84 BPM | TEMPERATURE: 98.9 F | BODY MASS INDEX: 25.94 KG/M2 | SYSTOLIC BLOOD PRESSURE: 116 MMHG | RESPIRATION RATE: 20 BRPM

## 2019-10-29 DIAGNOSIS — R10.9 RIGHT FLANK PAIN: Primary | ICD-10-CM

## 2019-10-29 LAB
ALBUMIN (URINE): ABNORMAL MG/DL
APPEARANCE UR: CLEAR
BACTERIA, UR: ABNORMAL
BILIRUB UR QL: ABNORMAL
CASTS/LPF: ABNORMAL
COLOR UR: YELLOW
EP/HPF: ABNORMAL
GLUCOSE URINE: ABNORMAL MG/DL
HGB UR QL: ABNORMAL
KETONES UR QL: ABNORMAL MG/DL
LEUKOCYTE ESTERASE - QUEST: ABNORMAL
MISC.: ABNORMAL
NITRITE UR QL STRIP: ABNORMAL
PH UR STRIP: 5.5 PH (ref 5–7)
RBC, UR MICRO: ABNORMAL (ref ?–2)
SP. GRAVITY: 1.02
UROBILINOGEN UR QL STRIP: 0.2 EU/DL (ref 0.2–1)
WBC, UR MICRO: ABNORMAL (ref ?–2)

## 2019-10-29 PROCEDURE — 99213 OFFICE O/P EST LOW 20 MIN: CPT | Performed by: FAMILY MEDICINE

## 2019-10-29 PROCEDURE — 81001 URINALYSIS AUTO W/SCOPE: CPT | Performed by: FAMILY MEDICINE

## 2019-10-29 RX ORDER — TAMSULOSIN HYDROCHLORIDE 0.4 MG/1
0.4 CAPSULE ORAL DAILY
Qty: 10 CAPSULE | Refills: 0 | Status: SHIPPED | OUTPATIENT
Start: 2019-10-29 | End: 2020-07-10

## 2019-10-29 ASSESSMENT — MIFFLIN-ST. JEOR: SCORE: 1693.17

## 2019-10-29 NOTE — LETTER
[facility]  [address], [address]  [place]  [address]    October 29, 2019        RE: Zurdo Navas  [address] [place] [address]    To Whom It May Concern,   The above named patient was seen at this clinic for acute visit today. Please excuse any absence .        REX Johansen M.D.

## 2019-10-29 NOTE — PROGRESS NOTES
SUBJECTIVE:   Zurdo Navas is a 48 year old male who complains of right low back pain for 1 weeks, positional with bending or lifting, with radiation to right groin after raking leaves a few days ago. Precipitating factors: raking leaves. Prior history of back problems: recurrent self limited episodes of low back pain in the past. There is no numbness in the legs. No fecal incontinence, saddle numbness, fever, or weakness.    Patient Active Problem List   Diagnosis     Health Care Home     ACP (advance care planning)     Overweight     History reviewed. No pertinent past medical history.  Family History   Problem Relation Age of Onset     Hypertension Mother      Hypertension Father          age 74     Other - See Comments Daughter         cervix hx     Social History     Socioeconomic History     Marital status:      Spouse name: Andria     Number of children: 1     Years of education: 12     Highest education level: Not on file   Occupational History     Occupation:      Employer: [employer]   Social Needs     Financial resource strain: Not on file     Food insecurity:     Worry: Not on file     Inability: Not on file     Transportation needs:     Medical: Not on file     Non-medical: Not on file   Tobacco Use     Smoking status: Former Smoker     Packs/day: 0.50     Years: 1.00     Pack years: 0.50     Last attempt to quit: 1/10/2006     Years since quittin.8     Smokeless tobacco: Never Used   Substance and Sexual Activity     Alcohol use: Yes     Alcohol/week: 1.0 standard drinks     Types: 1 Standard drinks or equivalent per week     Drug use: No     Sexual activity: Yes     Partners: Female     Birth control/protection: Condom   Lifestyle     Physical activity:     Days per week: Not on file     Minutes per session: Not on file     Stress: Not on file   Relationships     Social connections:     Talks on phone: Not on file     Gets together: Not on file     Attends  "Congregation service: Not on file     Active member of club or organization: Not on file     Attends meetings of clubs or organizations: Not on file     Relationship status: Not on file     Intimate partner violence:     Fear of current or ex partner: Not on file     Emotionally abused: Not on file     Physically abused: Not on file     Forced sexual activity: Not on file   Other Topics Concern      Service No     Blood Transfusions No     Caffeine Concern No     Occupational Exposure No     Hobby Hazards No     Sleep Concern No     Stress Concern No     Weight Concern No     Special Diet No     Back Care No     Exercise No     Bike Helmet Not Asked     Seat Belt Yes     Self-Exams Not Asked   Social History Narrative    needs primary tetanus diphtheria series, first shot oct 29 2003     Past Surgical History:   Procedure Laterality Date     NO HISTORY OF SURGERY       hydrocortisone valerate (WEST-SAWYER) 0.2 % ointment, Apply sparingly to affected area three times daily as needed.    No current facility-administered medications on file prior to visit.        Allergies: No known allergies    Immunization History   Administered Date(s) Administered     TD (ADULT, 7+) 10/29/2003, 01/26/2004     TDAP Vaccine (Boostrix) 01/14/2015        OBJECTIVE:  /82 (BP Location: Right arm, Patient Position: Chair, Cuff Size: Adult Regular)   Pulse 84   Temp 98.9  F (37.2  C) (Oral)   Resp 20   Ht 1.778 m (5' 10\")   Wt 82.2 kg (181 lb 3.2 oz)   BMI 26.00 kg/m     Patient appears to be in mild  pain, no antalgic gait noted. Lumbosacral spine area reveals no local tenderness or mass.  Slightly Painful and reduced LS ROM noted. Straight leg raise is negative at 70 degrees on both sides. DTR's, motor strength and sensation normal, including heel and toe gait.  Peripheral pulses are palpable. X-Ray: not indicated.    UA does show 5-10 RBC    ASSESSMENT:   Right back/flank pain- some symptoms and exam findings fit with " muscular issue but renal colic also possible-no significant symptoms at this time    PLAN:  We agree to try flomax for possible stone passage help, strain urine, return with stone if found, also gentle stretches in case this more of back issue    patient given instructions to go to emergency department immediately if worsening of symptoms and verbalizes this understanding Call or return to clinic prn if these symptoms worsen or fail to improve as anticipated.

## 2019-10-29 NOTE — NURSING NOTE
Questioned patient about current smoking habits.  Pt. quit smoking some time ago.  PULSE regular  My Chart:   CLASSIFICATION OF OVERWEIGHT AND OBESITY BY BMI                        Obesity Class           BMI(kg/m2)  Underweight                                    < 18.5  Normal                                         18.5-24.9  Overweight                                     25.0-29.9  OBESITY                     I                  30.0-34.9                             II                 35.0-39.9  EXTREME OBESITY             III                >40                            Patient's  BMI Body mass index is 26 kg/m .  http://hin.nhlbi.nih.gov/menuplanner/menu.cgi  Pre-visit planning  Immunizations - up to date  Colonoscopy -   Mammogram -   Asthma -   PHQ9 -    LOLY-7 -

## 2019-10-29 NOTE — NURSING NOTE
Zurdo Navas is here for lower right side back pain for the past week. Now feels to his right front side ant testicle.

## 2020-07-10 ENCOUNTER — OFFICE VISIT (OUTPATIENT)
Dept: FAMILY MEDICINE | Facility: CLINIC | Age: 50
End: 2020-07-10

## 2020-07-10 VITALS
DIASTOLIC BLOOD PRESSURE: 88 MMHG | WEIGHT: 193.4 LBS | SYSTOLIC BLOOD PRESSURE: 126 MMHG | RESPIRATION RATE: 20 BRPM | HEART RATE: 72 BPM | BODY MASS INDEX: 27.07 KG/M2 | HEIGHT: 71 IN | TEMPERATURE: 98.1 F

## 2020-07-10 DIAGNOSIS — Z12.5 SPECIAL SCREENING FOR MALIGNANT NEOPLASM OF PROSTATE: ICD-10-CM

## 2020-07-10 DIAGNOSIS — L30.9 DERMATITIS: ICD-10-CM

## 2020-07-10 DIAGNOSIS — Z12.11 SPECIAL SCREENING FOR MALIGNANT NEOPLASMS, COLON: ICD-10-CM

## 2020-07-10 DIAGNOSIS — G56.02 CARPAL TUNNEL SYNDROME OF LEFT WRIST: ICD-10-CM

## 2020-07-10 DIAGNOSIS — Z00.00 ROUTINE GENERAL MEDICAL EXAMINATION AT A HEALTH CARE FACILITY: Primary | ICD-10-CM

## 2020-07-10 LAB
ALBUMIN SERPL-MCNC: 4.3 G/DL (ref 3.6–5.1)
ALBUMIN/GLOB SERPL: 1.3 {RATIO} (ref 1–2.5)
ALP SERPL-CCNC: 63 U/L (ref 33–130)
ALT 1742-6: 15 U/L (ref 0–32)
AST 1920-8: 14 U/L (ref 0–35)
BILIRUB SERPL-MCNC: 0.8 MG/DL (ref 0.2–1.2)
BUN SERPL-MCNC: 9.6 MG/DL (ref 7–25)
BUN/CREATININE RATIO: 14.1 (ref 6–22)
CALCIUM SERPL-MCNC: 9.6 MG/DL (ref 8.6–10.3)
CHLORIDE SERPLBLD-SCNC: 102.7 MMOL/L (ref 98–110)
CHOLEST SERPL-MCNC: 196 MG/DL (ref 0–199)
CHOLEST/HDLC SERPL: 4 {RATIO} (ref 0–5)
CO2 SERPL-SCNC: 30 MMOL/L (ref 20–32)
CREAT SERPL-MCNC: 0.99 MG/DL (ref 0.7–1.18)
GLOBULIN, CALCULATED - QUEST: 3.2 (ref 1.9–3.7)
GLUCOSE SERPL-MCNC: 103 MG/DL (ref 60–99)
HDLC SERPL-MCNC: 45 MG/DL (ref 40–150)
LDLC SERPL CALC-MCNC: 122 MG/DL (ref 0–130)
POTASSIUM SERPL-SCNC: 4.29 MMOL/L (ref 3.5–5.3)
PROT SERPL-MCNC: 7.5 G/DL (ref 6.1–8.1)
SODIUM SERPL-SCNC: 139.3 MMOL/L (ref 135–146)
TRIGL SERPL-MCNC: 143 MG/DL (ref 0–149)

## 2020-07-10 PROCEDURE — 80053 COMPREHEN METABOLIC PANEL: CPT | Performed by: FAMILY MEDICINE

## 2020-07-10 PROCEDURE — 36415 COLL VENOUS BLD VENIPUNCTURE: CPT | Performed by: FAMILY MEDICINE

## 2020-07-10 PROCEDURE — L3908 WHO COCK-UP NONMOLDE PRE OTS: HCPCS | Performed by: FAMILY MEDICINE

## 2020-07-10 PROCEDURE — 99396 PREV VISIT EST AGE 40-64: CPT | Performed by: FAMILY MEDICINE

## 2020-07-10 PROCEDURE — 80061 LIPID PANEL: CPT | Performed by: FAMILY MEDICINE

## 2020-07-10 PROCEDURE — 84153 ASSAY OF PSA TOTAL: CPT | Mod: 90 | Performed by: FAMILY MEDICINE

## 2020-07-10 RX ORDER — MV-MINS/FOLIC/LYCOPENE/GINKGO 400-300MCG
TABLET ORAL
COMMUNITY
Start: 2020-07-10

## 2020-07-10 RX ORDER — HYDROCORTISONE VALERATE 2 MG/G
OINTMENT TOPICAL
Qty: 45 G | Refills: 0 | Status: SHIPPED | OUTPATIENT
Start: 2020-07-10 | End: 2022-04-12

## 2020-07-10 ASSESSMENT — MIFFLIN-ST. JEOR: SCORE: 1759.39

## 2020-07-10 NOTE — PROGRESS NOTES
3  SUBJECTIVE:   CC: Zurdo Navas is an 49 year old male who presents for preventive health visit.     Healthy Habits:    Do you get at least three servings of calcium containing foods daily (dairy, green leafy vegetables, etc.)? yes    Amount of exercise or daily activities, outside of work: 1 day(s) per week    Problems taking medications regularly No    Medication side effects: No    Have you had an eye exam in the past two years? yes    Do you see a dentist twice per year? yes    Do you have sleep apnea, excessive snoring or daytime drowsiness?no      Pt notes intermittent tingling/numbness in left hand and left foot for a month- does not feel in both places at same time, no weakness, no speech, vision, thought process changes.    Pt notes hand symptoms mainly when at work, does repetitive motions as     Pt also notes some circular patches of hair loss I last few     Today's PHQ-2 Score:   PHQ-2 (  [employer]) 2018   Q1: Little interest or pleasure in doing things 0 0   Q2: Feeling down, depressed or hopeless 0 0   PHQ-2 Score 0 0       Abuse: Current or Past(Physical, Sexual or Emotional)- No  Do you feel safe in your environment? Yes        Social History     Tobacco Use     Smoking status: Former Smoker     Packs/day: 0.50     Years: 1.00     Pack years: 0.50     Last attempt to quit: 1/10/2006     Years since quittin.5     Smokeless tobacco: Never Used   Substance Use Topics     Alcohol use: Yes     Alcohol/week: 1.0 standard drinks     Types: 1 Standard drinks or equivalent per week     If you drink alcohol do you typically have >3 drinks per day or >7 drinks per week? No                      Last PSA: No results found for: PSA    Reviewed orders with patient. Reviewed health maintenance and updated orders accordingly - Yes  BP Readings from Last 3 Encounters:   07/10/20 126/88   10/29/19 116/82   01/10/19 120/76    Wt Readings from Last 3 Encounters:   07/10/20 87.7  kg (193 lb 6.4 oz)   10/29/19 82.2 kg (181 lb 3.2 oz)   01/10/19 83.2 kg (183 lb 6.4 oz)                  Patient Active Problem List   Diagnosis     Health Care Home     ACP (advance care planning)     Overweight     Past Surgical History:   Procedure Laterality Date     NO HISTORY OF SURGERY         Social History     Tobacco Use     Smoking status: Former Smoker     Packs/day: 0.50     Years: 1.00     Pack years: 0.50     Last attempt to quit: 1/10/2006     Years since quittin.5     Smokeless tobacco: Never Used   Substance Use Topics     Alcohol use: Yes     Alcohol/week: 1.0 standard drinks     Types: 1 Standard drinks or equivalent per week     Family History   Problem Relation Age of Onset     Hypertension Mother      Diabetes Mother      Cerebrovascular Disease Mother      Hypertension Father          age 74     Diabetes Sister      Coronary Artery Disease Brother      Other - See Comments Daughter         cervix hx     Colon Cancer No family hx of      Prostate Cancer No family hx of          Current Outpatient Medications   Medication Sig Dispense Refill     hydrocortisone valerate (WEST-SAWYER) 0.2 % external ointment Apply sparingly to affected area three times daily as needed. 45 g 0     Multiple Vitamins-Minerals (ONE-A-DAY MENS 50+ ADVANTAGE) TABS        Allergies   Allergen Reactions     No Known Allergies      Recent Labs   Lab Test 18  1120 16  1028 01/14/15  1039 01/14/15  1029   * 119 146*  --    HDL 38* 38* 44  --    TRIG 99 111 104  --    ALT  --   --   --  26   CR  --   --   --  1.04   GFRESTIMATED  --   --   --  86   POTASSIUM  --   --   --  4.3        Reviewed and updated as needed this visit by clinical staff  Tobacco         Reviewed and updated as needed this visit by Provider        No past medical history on file.   Past Surgical History:   Procedure Laterality Date     NO HISTORY OF SURGERY         ROS:  CONSTITUTIONAL: NEGATIVE for fever, chills, change in  "weight  EYES: NEGATIVE for vision changes or irritation  ENT: NEGATIVE for ear, mouth and throat problems  RESP: NEGATIVE for significant cough or SOB  CV: NEGATIVE for chest pain, palpitations or peripheral edema  GI: NEGATIVE for nausea, abdominal pain, heartburn, or change in bowel habits   male: negative for dysuria, hematuria, decreased urinary stream, erectile dysfunction, urethral discharge  MUSCULOSKELETAL: NEGATIVE for significant arthralgias or myalgia  ENDOCRINE: NEGATIVE for temperature intolerance, skin/hair changes  HEME/ALLERGY/IMMUNE: NEGATIVE for bleeding problems  PSYCHIATRIC: NEGATIVE for changes in mood or affect    OBJECTIVE:   /88 (BP Location: Left arm, Patient Position: Chair, Cuff Size: Adult Regular)   Pulse 72   Temp 98.1  F (36.7  C)   Resp 20   Ht 1.803 m (5' 11\")   Wt 87.7 kg (193 lb 6.4 oz)   BMI 26.97 kg/m    EXAM:  GENERAL: healthy, alert and no distress  EYES: Eyes grossly normal to inspection, PERRL and conjunctivae and sclerae normal  HENT: ear canals and TM's normal, nose and mouth without ulcers or lesions  NECK: no adenopathy, no asymmetry, masses, or scars and thyroid normal to palpation  RESP: lungs clear to auscultation - no rales, rhonchi or wheezes  CV: regular rate and rhythm, normal S1 S2, no S3 or S4, no murmur, click or rub, no peripheral edema and peripheral pulses strong  ABDOMEN: soft, nontender, no hepatosplenomegaly, no masses and bowel sounds normal   (male): normal male genitalia without lesions or urethral discharge, no hernia  RECTAL (male): deferred  MS: no gross musculoskeletal defects noted, no edema  SKIN: slight areas of patchy hair regrowth, almost back to normal  NEURO: Normal strength and tone, mentation intact and speech normal  NEURO: pt with positive Tinels and Phalens on left   PSYCH: mentation appears normal, affect normal/bright    Diagnostic Test Results:  Labs reviewed in Epic    ASSESSMENT/PLAN:   (Z00.00) Routine general " "medical examination at a health care facility  (primary encounter diagnosis)  Comment: discussed preventitive healthcare   Plan: Lipid Panel ([facility]), Comprehensive Metobolic         Panel ([facility]), VENOUS COLLECTION        Continue to work on healthy diet and exercise, discussed healthy habits     (Z12.5) Special screening for malignant neoplasm of prostate  Comment:   Plan: HCL PSA, SCREENING ([facility]), VENOUS COLLECTION            (Z12.11) Special screening for malignant neoplasms, colon  Comment:   Plan: GASTROENTEROLOGY ADULT REF PROCEDURE ONLY            (L30.9) Dermatitis  Comment: ding well right now but gets flares of itchy skin on occasion low bacl, rectal area, arms  Plan: hydrocortisone valerate (WEST-SAWYER) 0.2 %         external ointment        continue current medications at current doses  discussed need to avoid extended exposure to hot water, apply moisturizing lotion regularly, and pat dry after bathing     (G56.02) Carpal tunnel syndrome of left wrist  Comment: pt does repetitive motions at work-likely cause  Plan: CTS splint, if not better consider ortho    COUNSELING:  Reviewed preventive health counseling, as reflected in patient instructions       Regular exercise       Healthy diet/nutrition       Vision screening       Colon cancer screening       Prostate cancer screening    Estimated body mass index is 26.97 kg/m  as calculated from the following:    Height as of this encounter: 1.803 m (5' 11\").    Weight as of this encounter: 87.7 kg (193 lb 6.4 oz).    Weight management plan: Discussed healthy diet and exercise guidelines     reports that he quit smoking about 14 years ago. He has a 0.50 pack-year smoking history. He has never used smokeless tobacco.      Counseling Resources:  ATP IV Guidelines  Pooled Cohorts Equation Calculator  FRAX Risk Assessment  ICSI Preventive Guidelines  Dietary Guidelines for Americans, 2010  USDA's MyPlate  ASA Prophylaxis  Lung CA Screening    Toñito Henson " MD [place]  [facility]

## 2020-07-10 NOTE — LETTER
July 13, 2020      Zurdo Navas  [address] [facility] [address]        Dear ,    We are writing to inform you of your test results.    Your test results fall within the expected range(s) or remain unchanged from previous results.  Please continue with current treatment plan.    The blood sugar is still running high so make sure you are working to decrease sugars and carbohydrates in your diet and exercising regularly.          Resulted Orders   Lipid Panel ([facility])   Result Value Ref Range    Cholesterol 196 0 - 199 mg/dL    Triglycerides 143 0 - 149 mg/dL    HDL Cholesterol 45 40 - 150 mg/dL    LDL Cholesterol Direct 122 0 - 130 mg/dL    Cholesterol/HDL Ratio 4 0 - 5   Comprehensive Metobolic Panel ([facility])   Result Value Ref Range    Carbon Dioxide 30.0 20 - 32 mmol/L    Creatinine 0.99 0.70 - 1.18 mg/dL    Glucose 103 (A) 60 - 99 mg/dL    Sodium 139.3 135 - 146 mmol/L    Potassium 4.29 3.5 - 5.3 mmol/L    Chloride 102.7 98 - 110 mmol/L    Protein Total 7.5 6.1 - 8.1 g/dL    Albumin 4.3 3.6 - 5.1 g/dL    Alkaline Phosphatase 63 33 - 130 U/L    ALT 15 0 - 32 U/L    AST 14 0 - 35 U/L    Bilirubin Total 0.8 0.2 - 1.2 mg/dL    Urea Nitrogen 9.6 7 - 25 mg/dL    Calcium 9.6 8.6 - 10.3 mg/dL    BUN/Creatinine Ratio 14.1 6 - 22    Globulin Calculated 3.2 1.9 - 3.7    A/G Ratio 1.3 1 - 2.5   HCL PSA, SCREENING ([facility])   Result Value Ref Range    Abbott PSA 2.5 < OR = 4.0 ng/mL      Comment:      The total PSA value from this assay system is   standardized against the WHO standard. The test   result will be approximately 20% lower when compared   to the equimolar-standardized total PSA (Shantel   Mary). Comparison of serial PSA results should be   interpreted with this fact in mind.     This test was performed using the Siemens   chemiluminescent method. Values obtained from   different assay methods cannot be used  interchangeably. PSA levels, regardless of  value, should not be interpreted as  absolute  evidence of the presence or absence of disease.         If you have any questions or concerns, please call the clinic at the number listed above.       Sincerely,        Toñito Johansen MD

## 2020-07-10 NOTE — NURSING NOTE
Zurdo Navas is here for a CPX.    Pre-visit planning  Immunizations -up to date  Colonoscopy -is due and ordered today  Mammogram -  Asthma test --  PHQ9 -  LOLY 7 -    Questioned patient about current smoking habits.  Pt. quit smoking some time ago.  Body mass index is 26.97 kg/m .  PULSE regular  My Chart:   CLASSIFICATION OF OVERWEIGHT AND OBESITY BY BMI                        Obesity Class           BMI(kg/m2)  Underweight                                    < 18.5  Normal                                         18.5-24.9  Overweight                                     25.0-29.9  OBESITY                     I                  30.0-34.9                             II                 35.0-39.9  EXTREME OBESITY             III                >40                            Patient's  BMI Body mass index is 26.97 kg/m .

## 2020-07-11 LAB — ABBOTT PSA - QUEST: 2.5 NG/ML

## 2020-07-20 ENCOUNTER — TELEPHONE (OUTPATIENT)
Dept: FAMILY MEDICINE | Facility: CLINIC | Age: 50
End: 2020-07-20

## 2020-07-20 DIAGNOSIS — L30.9 DERMATITIS: Primary | ICD-10-CM

## 2020-07-20 RX ORDER — TRIAMCINOLONE ACETONIDE 1 MG/G
CREAM TOPICAL 2 TIMES DAILY
Qty: 45 G | Refills: 1 | Status: SHIPPED | OUTPATIENT
Start: 2020-07-20 | End: 2022-04-12

## 2020-07-20 NOTE — TELEPHONE ENCOUNTER
Zurdo called and wanted to know if there is an alternative to the ointment you prescribed on 7/10/20 for a rash. He went to his pharmacy and it is very expensive. Please advise, thanks.      Pharmacy is [facility] on [address] in [place].

## 2022-04-12 ENCOUNTER — OFFICE VISIT (OUTPATIENT)
Dept: FAMILY MEDICINE | Facility: CLINIC | Age: 52
End: 2022-04-12

## 2022-04-12 VITALS
RESPIRATION RATE: 20 BRPM | WEIGHT: 180 LBS | TEMPERATURE: 98.1 F | BODY MASS INDEX: 25.77 KG/M2 | HEART RATE: 76 BPM | SYSTOLIC BLOOD PRESSURE: 132 MMHG | HEIGHT: 70 IN | DIASTOLIC BLOOD PRESSURE: 84 MMHG

## 2022-04-12 DIAGNOSIS — R21 RASH AND NONSPECIFIC SKIN ERUPTION: Primary | ICD-10-CM

## 2022-04-12 DIAGNOSIS — Z71.89 ACP (ADVANCE CARE PLANNING): ICD-10-CM

## 2022-04-12 PROCEDURE — 99213 OFFICE O/P EST LOW 20 MIN: CPT | Performed by: FAMILY MEDICINE

## 2022-04-12 RX ORDER — TRIAMCINOLONE ACETONIDE 1 MG/G
OINTMENT TOPICAL 2 TIMES DAILY
Qty: 30 G | Refills: 1 | Status: SHIPPED | OUTPATIENT
Start: 2022-04-12 | End: 2024-03-07

## 2022-04-12 NOTE — NURSING NOTE
Zurdo aNvas is here for a rash on his right buttock for some time. This comes and goes, not sure what is causing this.    Questioned patient about current smoking habits.  Pt. quit smoking some time ago.  PULSE regular  My Chart:   CLASSIFICATION OF OVERWEIGHT AND OBESITY BY BMI                        Obesity Class           BMI(kg/m2)  Underweight                                    < 18.5  Normal                                         18.5-24.9  Overweight                                     25.0-29.9  OBESITY                     I                  30.0-34.9                             II                 35.0-39.9  EXTREME OBESITY             III                >40                            Patient's  BMI Body mass index is 25.83 kg/m .  http://hin.nhlbi.nih.gov/menuplanner/menu.cgi  Pre-visit planning  Immunizations - up to date  Colonoscopy - is up to date  Mammogram -   Asthma -   PHQ9 -    LOLY-7 -

## 2022-04-12 NOTE — PROGRESS NOTES
"  Assessment & Plan     Rash and nonspecific skin eruption  Likely eczematous rash,  discussed need to avoid extended exposure to hot water, apply moisturizing lotion regularly, and pat dry after bathing     Prn triamcinolone  - triamcinolone (KENALOG) 0.1 % external ointment  Dispense: 30 g; Refill: 1    ACP (advance care planning)        Prescription drug management         BMI:   Estimated body mass index is 25.83 kg/m  as calculated from the following:    Height as of this encounter: 1.778 m (5' 10\").    Weight as of this encounter: 81.6 kg (180 lb).         No follow-ups on file.    Toñito Johansen MD  [facility]    Kathleen Renner is a 51 year old who presents for the following health issues  accompanied by his spouse.    HPI     Rash  Onset/Duration: 2-3 months  Description  Location: right buttock and scrotal area  Character: \"itchy\"  Itching: moderate    Also occasional burning sensation left upper back area  Intensity:  moderate  Progression of Symptoms:  waxing and waning  Accompanying signs and symptoms:   Fever: no  Body aches or joint pain: no  Sore throat symptoms: no  Recent cold symptoms: no  History:           Previous episodes of similar rash: maybe some years ago  New exposures:  soaps -switched to a new herbal soap abot the same time- since stopped this soap 2 days ago  Recent travel: no  Exposure to similar rash: no  Precipitating or alleviating factors:   Therapies tried and outcome: OTC ointment    Pt also notes pain in right forearm for 2 weeks , comes and goes, no numbness    Review of Systems   Constitutional, HEENT, cardiovascular, pulmonary, gi and gu systems are negative, except as otherwise noted.      Objective    /84 (BP Location: Right arm, Patient Position: Chair, Cuff Size: Adult Regular)   Pulse 76   Temp 98.1  F (36.7  C)   There is no height or weight on file to calculate BMI.  Physical Exam   GENERAL: healthy, alert and no distress  NECK: " no adenopathy, no asymmetry, masses, or scars and thyroid normal to palpation  RESP: lungs clear to auscultation - no rales, rhonchi or wheezes  CV: regular rate and rhythm, normal S1 S2, no S3 or S4, no murmur, click or rub, no peripheral edema and peripheral pulses strong  ABDOMEN: soft, nontender, no hepatosplenomegaly, no masses and bowel sounds normal  SKIN: some dry, slightly lichinified appearing skin right buttock , no rash noted scrotal or back

## 2022-06-02 ENCOUNTER — OFFICE VISIT (OUTPATIENT)
Dept: FAMILY MEDICINE | Facility: CLINIC | Age: 52
End: 2022-06-02

## 2022-06-02 VITALS
TEMPERATURE: 97.1 F | BODY MASS INDEX: 27.16 KG/M2 | HEIGHT: 71 IN | SYSTOLIC BLOOD PRESSURE: 128 MMHG | RESPIRATION RATE: 20 BRPM | WEIGHT: 194 LBS | DIASTOLIC BLOOD PRESSURE: 88 MMHG | HEART RATE: 72 BPM

## 2022-06-02 DIAGNOSIS — Z12.11 SPECIAL SCREENING FOR MALIGNANT NEOPLASMS, COLON: ICD-10-CM

## 2022-06-02 DIAGNOSIS — R73.03 PREDIABETES: ICD-10-CM

## 2022-06-02 DIAGNOSIS — Z11.59 SCREENING FOR VIRAL DISEASE: ICD-10-CM

## 2022-06-02 DIAGNOSIS — Z12.5 SPECIAL SCREENING FOR MALIGNANT NEOPLASM OF PROSTATE: ICD-10-CM

## 2022-06-02 DIAGNOSIS — Z00.00 ROUTINE GENERAL MEDICAL EXAMINATION AT A HEALTH CARE FACILITY: Primary | ICD-10-CM

## 2022-06-02 LAB
ALBUMIN SERPL-MCNC: 4.4 G/DL (ref 3.6–5.1)
ALBUMIN/GLOB SERPL: 1.3 {RATIO} (ref 1–2.5)
ALP SERPL-CCNC: 50 U/L (ref 33–130)
ALT 1742-6: 50 U/L (ref 0–32)
AST 1920-8: 27 U/L (ref 0–35)
BILIRUB SERPL-MCNC: 1 MG/DL (ref 0.2–1.2)
BUN SERPL-MCNC: 10 MG/DL (ref 7–25)
BUN/CREATININE RATIO: 9.9 (ref 6–22)
CALCIUM SERPL-MCNC: 9.6 MG/DL (ref 8.6–10.3)
CHLORIDE SERPLBLD-SCNC: 100.6 MMOL/L (ref 98–110)
CHOLEST SERPL-MCNC: 212 MG/DL (ref 0–199)
CHOLEST/HDLC SERPL: 5 {RATIO} (ref 0–5)
CO2 SERPL-SCNC: 31.3 MMOL/L (ref 20–32)
CREAT SERPL-MCNC: 1.01 MG/DL (ref 0.6–1.3)
GLOBULIN, CALCULATED - QUEST: 3.3 (ref 1.9–3.7)
GLUCOSE SERPL-MCNC: 97 MG/DL (ref 60–99)
HDLC SERPL-MCNC: 47 MG/DL (ref 40–150)
LDLC SERPL CALC-MCNC: 139 MG/DL (ref 0–130)
POTASSIUM SERPL-SCNC: 4.03 MMOL/L (ref 3.5–5.3)
PROT SERPL-MCNC: 7.7 G/DL (ref 6.1–8.1)
SODIUM SERPL-SCNC: 138.8 MMOL/L (ref 135–146)
TRIGL SERPL-MCNC: 130 MG/DL (ref 0–149)

## 2022-06-02 PROCEDURE — 80053 COMPREHEN METABOLIC PANEL: CPT | Performed by: FAMILY MEDICINE

## 2022-06-02 PROCEDURE — 80061 LIPID PANEL: CPT | Performed by: FAMILY MEDICINE

## 2022-06-02 PROCEDURE — 99396 PREV VISIT EST AGE 40-64: CPT | Performed by: FAMILY MEDICINE

## 2022-06-02 PROCEDURE — 84153 ASSAY OF PSA TOTAL: CPT | Mod: 90 | Performed by: FAMILY MEDICINE

## 2022-06-02 PROCEDURE — 87389 HIV-1 AG W/HIV-1&-2 AB AG IA: CPT | Mod: 90 | Performed by: FAMILY MEDICINE

## 2022-06-02 PROCEDURE — 36415 COLL VENOUS BLD VENIPUNCTURE: CPT | Performed by: FAMILY MEDICINE

## 2022-06-02 PROCEDURE — 86803 HEPATITIS C AB TEST: CPT | Mod: 90 | Performed by: FAMILY MEDICINE

## 2022-06-02 NOTE — NURSING NOTE
Zurdo Navas is here for a CPX.    Pre-visit planning  Immunizations -up to date  Colonoscopy -is due and ordered today  Mammogram -  Asthma test --  PHQ9 -  LOLY 7 -      Questioned patient about current smoking habits.  Pt. quit smoking some time ago.  Body mass index is 27.44 kg/m .  PULSE regular  My Chart:   CLASSIFICATION OF OVERWEIGHT AND OBESITY BY BMI                        Obesity Class           BMI(kg/m2)  Underweight                                    < 18.5  Normal                                         18.5-24.9  Overweight                                     25.0-29.9  OBESITY                     I                  30.0-34.9                             II                 35.0-39.9  EXTREME OBESITY             III                >40                            Patient's  BMI Body mass index is 27.44 kg/m .

## 2022-06-02 NOTE — LETTER
June 7, 2022      Zurdo Navas  [address] [facility] [address]        Dear ,    We are writing to inform you of your test results.    Labs are normal for HIV and Hepatitis C    Your recent cholesterol panel shows mildly elevated readings.  At this point I would recommend lifestyle modifications to include regular exercise, as well as changes in your diet to include more fruits and vegetables with  fewer carbohydrates, especially processed carbohydrates (sugars,  bread, rice, pasta, chips etc.)  We can recheck your labs in 6 months or so to measure your progress.    Glucose, PSA, liver and kidney look fine.     Resulted Orders   Lipid Panel ([facility])   Result Value Ref Range    Cholesterol 212 (A) 0 - 199 mg/dL    Triglycerides 130 0 - 149 mg/dL    HDL Cholesterol 47 40 - 150 mg/dL    LDL Cholesterol Direct 139 (A) 0 - 130 mg/dL    Cholesterol/HDL Ratio 5 0 - 5   PSA Total ([facility])   Result Value Ref Range    Abbott PSA 2.06 < OR = 4.00 ng/mL      Comment:      The total PSA value from this assay system is   standardized against the WHO standard. The test   result will be approximately 20% lower when compared   to the equimolar-standardized total PSA (Shatnel   Mary). Comparison of serial PSA results should be   interpreted with this fact in mind.     This test was performed using the Siemens   chemiluminescent method. Values obtained from   different assay methods cannot be used  interchangeably. PSA levels, regardless of  value, should not be interpreted as absolute  evidence of the presence or absence of disease.     HEPATITIS C ANTIBODY ([facility])   Result Value Ref Range    HCV Antibody NON-REACTIVE NON-REACTIVE    SIGNAL TO CUT OFF - [facility] 0.06 <1.00      Comment:         HCV antibody was non-reactive. There is no laboratory   evidence of HCV infection.     In most cases, no further action is required. However,  if recent HCV exposure is suspected, a test for HCV RNA  (test code 20565)  is suggested.     For additional information please refer to  http://education.MatchMate.Me/faq/RLA51x2  (This link is being provided for informational/  educational purposes only.)        HIV 1/2 Agn Laura 4th Gen w Reflex ([facility])   Result Value Ref Range    HIV 1/2 Agn Laura 4th Gen With Reflex NON-REACTIVE NON-REACTIVE      Comment:      HIV-1 antigen and HIV-1/HIV-2 antibodies were not  detected. There is no laboratory evidence of HIV  infection.     PLEASE NOTE: This information has been disclosed to  you from records whose confidentiality may be  protected by state law.  If your state requires such  protection, then the state law prohibits you from  making any further disclosure of the information  without the specific written consent of the person  to whom it pertains, or as otherwise permitted by law.  A general authorization for the release of medical or  other information is NOT sufficient for this purpose.      For additional information please refer to  http://getbetter!.MatchMate.Me/faq/IYJ713  (This link is being provided for informational/  educational purposes only.)        The performance of this assay has not been clinically  validated in patients less than 2 years old.        Comprehensive Metobolic Panel ([facility])   Result Value Ref Range    Carbon Dioxide 31.3 20 - 32 mmol/L    Creatinine 1.01 0.60 - 1.30 mg/dL    Glucose 97 60 - 99 mg/dL    Sodium 138.8 135 - 146 mmol/L    Potassium 4.03 3.5 - 5.3 mmol/L    Chloride 100.6 98 - 110 mmol/L    Protein Total 7.7 6.1 - 8.1 g/dL    Albumin 4.4 3.6 - 5.1 g/dL    Alkaline Phosphatase 50 33 - 130 U/L    ALT 50 (A) 0 - 32 U/L    AST 27 0 - 35 U/L    Bilirubin Total 1.0 0.2 - 1.2 mg/dL    Urea Nitrogen 10 7 - 25 mg/dL    Calcium 9.6 8.6 - 10.3 mg/dL    BUN/Creatinine Ratio 9.9 6 - 22    Globulin Calculated 3.3 1.9 - 3.7    A/G Ratio 1.3 1 - 2.5       If you have any questions or concerns, please call the clinic at the number listed above.        Sincerely,      Toñito Johansen MD

## 2022-06-02 NOTE — PROGRESS NOTES
3  SUBJECTIVE:   CC: Zurdo Navas is an 51 year old male who presents for preventive health visit.       Patient has been advised of split billing requirements and indicates understanding: Yes  Healthy Habits:    Do you get at least three servings of calcium containing foods daily (dairy, green leafy vegetables, etc.)? yes    Amount of exercise or daily activities, outside of work: a few day(s) per week    Problems taking medications regularly not applicable    Medication side effects: No    Have you had an eye exam in the past two years? yes    Do you see a dentist twice per year? 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 Yes        Social History     Tobacco Use     Smoking status: Former Smoker     Packs/day: 0.50     Years: 1.00     Pack years: 0.50     Quit date: 1/10/2006     Years since quittin.4     Smokeless tobacco: Never Used   Substance Use Topics     Alcohol use: Yes     Alcohol/week: 1.0 standard drink     Types: 1 Standard drinks or equivalent per week     If you drink alcohol do you typically have >3 drinks per day or >7 drinks per week? No                      Last PSA:   [facility]bbott PSA   Date Value Ref Range Status   07/10/2020 2.5 < OR = 4.0 ng/mL Final     Comment:     The total PSA value from this assay system is   standardized against the WHO standard. The test   result  will be approximately 20% lower when compared   to the equimolar-standardized total PSA (Shantel   Mary). Comparison of serial PSA results should be   interpreted with this fact in mind.     This test was performed using the Siemens   chemiluminescent method. Values obtained from   different assay methods cannot be used  interchangeably. PSA levels, regardless of  value, should not be interpreted as absolute  evidence of the presence or absence of disease.         Reviewed orders with patient. Reviewed health maintenance and updated orders accordingly - Yes  BP Readings from Last 3 Encounters:   22 128/88   22 132/84   07/10/20 126/88    Wt Readings from Last 3 Encounters:   22 88 kg (194 lb)   22 81.6 kg (180 lb)   07/10/20 87.7 kg (193 lb 6.4 oz)                  Patient Active Problem List   Diagnosis     Health Care Home     ACP (advance care planning)     Overweight     Past Surgical History:   Procedure Laterality Date     NO HISTORY OF SURGERY         Social History     Tobacco Use     Smoking status: Former Smoker     Packs/day: 0.50     Years: 1.00     Pack years: 0.50     Quit date: 1/10/2006     Years since quittin.4     Smokeless tobacco: Never Used   Substance Use Topics     Alcohol use: Yes     Alcohol/week: 1.0 standard drink     Types: 1 Standard drinks or equivalent per week     Family History   Problem Relation Age of Onset     Hypertension Mother      Diabetes Mother      Cerebrovascular Disease Mother      Hypertension Father          age 74     Diabetes Sister      Coronary Artery Disease Brother      Other - See Comments Daughter         cervix hx     Colon Cancer No family hx of      Prostate Cancer No family hx of          Current Outpatient Medications   Medication Sig Dispense Refill     Multiple Vitamins-Minerals (ONE-A-DAY MENS 50+ ADVANTAGE) TABS        triamcinolone (KENALOG) 0.1 % external ointment Apply topically 2 times daily 30 g 1     Allergies  "  Allergen Reactions     No Known Allergies      Recent Labs   Lab Test 07/10/20  1117 07/10/20  1116 08/13/18  1120 11/28/16  1028 01/14/15  1039 01/14/15  1029   LDL  --  122 115* 119   < >  --    HDL  --  45 38* 38*   < >  --    TRIG  --  143 99 111   < >  --    ALT  --   --   --   --   --  26   CR 0.99  --   --   --   --  1.04   GFRESTIMATED  --   --   --   --   --  86   POTASSIUM 4.29  --   --   --   --  4.3    < > = values in this interval not displayed.        Reviewed and updated as needed this visit by clinical staff   Tobacco   Meds  Problems  Med Hx  Surg Hx             Reviewed and updated as needed this visit by Provider                   History reviewed. No pertinent past medical history.   Past Surgical History:   Procedure Laterality Date     NO HISTORY OF SURGERY         ROS:  CONSTITUTIONAL: NEGATIVE for fever, chills, change in weight  INTEGUMENTARY/SKIN: NEGATIVE for worrisome rashes, moles or lesions  EYES: NEGATIVE for vision changes or irritation  ENT: NEGATIVE for ear, mouth and throat problems  RESP: NEGATIVE for significant cough or SOB  CV: NEGATIVE for chest pain, palpitations or peripheral edema  GI: NEGATIVE for nausea, abdominal pain, heartburn, or change in bowel habits   male: negative for dysuria, hematuria, decreased urinary stream, erectile dysfunction, urethral discharge  MUSCULOSKELETAL: NEGATIVE for significant arthralgias or myalgia  NEURO: NEGATIVE for weakness, dizziness or paresthesias  ENDOCRINE: NEGATIVE for temperature intolerance, skin/hair changes  PSYCHIATRIC: NEGATIVE for changes in mood or affect    OBJECTIVE:   /88 (BP Location: Right arm, Patient Position: Chair, Cuff Size: Adult Large)   Pulse 72   Temp 97.1  F (36.2  C) (Temporal)   Resp 20   Ht 1.791 m (5' 10.5\")   Wt 88 kg (194 lb)   BMI 27.44 kg/m    EXAM:  GENERAL: healthy, alert and no distress  EYES: Eyes grossly normal to inspection, PERRL and conjunctivae and sclerae normal  HENT: ear " canals and TM's normal, nose and mouth without ulcers or lesions  NECK: no adenopathy, no asymmetry, masses, or scars and thyroid normal to palpation  RESP: lungs clear to auscultation - no rales, rhonchi or wheezes  CV: regular rate and rhythm, normal S1 S2, no S3 or S4, no murmur, click or rub, no peripheral edema and peripheral pulses strong  ABDOMEN: soft, nontender, no hepatosplenomegaly, no masses and bowel sounds normal   (male): normal male genitalia without lesions or urethral discharge, no hernia  RECTAL (male): deferred  MS: no gross musculoskeletal defects noted, no edema  SKIN: no suspicious lesions or rashes  NEURO: Normal strength and tone, mentation intact and speech normal  PSYCH: mentation appears normal, affect normal/bright    Diagnostic Test Results:  Labs reviewed in Epic    ASSESSMENT/PLAN:   (Z00.00) Routine general medical examination at a health care facility  (primary encounter diagnosis)  Comment: discussed preventitive healthcare   Plan: Continue to work on healthy diet and exercise, discussed healthy habits     Recommend he cushion seatbelt at work to see if helps burning    Use hydrocortisone for itchy skin    Use pepcid daily for heartburn-f/u if not resolved    (Z12.5) Special screening for malignant neoplasm of prostate  Comment:   Plan: VENOUS COLLECTION, PSA Total ([facility])            (Z12.11) Special screening for malignant neoplasms, colon  Comment:   Plan: VENOUS COLLECTION, COLOGUARD([employer])            (R73.03) Prediabetes  Comment:   Plan: Lipid Panel ([facility]), COMPREHENSIVE METABOLIC         PANEL ([facility]), VENOUS COLLECTION            (Z11.59) Screening for viral disease  Comment:   Plan: VENOUS COLLECTION, HEPATITIS C ANTIBODY         ([facility]), HIV 1/2 Agn Laura 4th Gen w Reflex         ([facility])              Patient has been advised of split billing requirements and indicates understanding: Yes  COUNSELING:  Reviewed preventive health counseling, as reflected in patient  "instructions       Regular exercise       Healthy diet/nutrition       Vision screening       Immunizations    Recommend shingrix and covid booster 2             Consider Hep C screening for all patients one time for ages 18-79 years       HIV screeninx in teen years, 1x in adult years, and at intervals if high risk       Colorectal cancer screening       Prostate cancer screening    Estimated body mass index is 27.44 kg/m  as calculated from the following:    Height as of this encounter: 1.791 m (5' 10.5\").    Weight as of this encounter: 88 kg (194 lb).    Weight management plan: Discussed healthy diet and exercise guidelines    He reports that he quit smoking about 16 years ago. He has a 0.50 pack-year smoking history. He has never used smokeless tobacco.      Counseling Resources:  ATP IV Guidelines  Pooled Cohorts Equation Calculator  FRAX Risk Assessment  ICSI Preventive Guidelines  Dietary Guidelines for Americans, 2010  USDA's MyPlate  ASA Prophylaxis  Lung CA Screening    Toñito Johansen MD  [facility]  "

## 2022-06-03 LAB
ABBOTT PSA - QUEST: 2.06 NG/ML
HCV AB - QUEST: NORMAL
HIV 1/2 AGN ABY 4TH GEN WITH REFLEX: NORMAL
SIGNAL TO CUT OFF - QUEST: 0.06

## 2022-07-15 ENCOUNTER — TELEPHONE (OUTPATIENT)
Dept: FAMILY MEDICINE | Facility: CLINIC | Age: 52
End: 2022-07-15

## 2022-07-15 NOTE — TELEPHONE ENCOUNTER
Pt dropped off Preventive Care Visit Form for [facility] to sign and mail in enclosed envelope. In [facility] black file rainey in mailroom. ca

## 2023-03-06 ENCOUNTER — OFFICE VISIT (OUTPATIENT)
Dept: FAMILY MEDICINE | Facility: CLINIC | Age: 53
End: 2023-03-06

## 2023-03-06 VITALS
HEART RATE: 86 BPM | SYSTOLIC BLOOD PRESSURE: 138 MMHG | WEIGHT: 200 LBS | DIASTOLIC BLOOD PRESSURE: 106 MMHG | RESPIRATION RATE: 20 BRPM | OXYGEN SATURATION: 96 % | BODY MASS INDEX: 28.29 KG/M2 | TEMPERATURE: 98 F

## 2023-03-06 DIAGNOSIS — S61.212A LACERATION OF RIGHT MIDDLE FINGER WITHOUT FOREIGN BODY WITHOUT DAMAGE TO NAIL, INITIAL ENCOUNTER: Primary | ICD-10-CM

## 2023-03-06 PROCEDURE — 99213 OFFICE O/P EST LOW 20 MIN: CPT | Performed by: PHYSICIAN ASSISTANT

## 2023-03-06 NOTE — NURSING NOTE
Chief Complaint   Patient presents with     Hand Injury     Cut middle finger on right hand two weeks ago, does not like the way it is healing and wondering if there are any options to help with this        Pre-visit Screening:  Immunizations:  up to date  Colonoscopy:  Is due and has cologuard kit at home-will try and get this done soon  Mammogram: CITLALY  Asthma Action Test/Plan:  CITLALY  PHQ9:  PHQ2 done today   GAD7:  NA  Questioned patient about current smoking habits Pt. quit smoking some time ago.  Ok to leave detailed message on voice mail for today's visit only Yes, phone # [phone]

## 2023-03-06 NOTE — PROGRESS NOTES
Assessment & Plan     Laceration of left middle finger without foreign body without damage to nail, initial encounter-wound appears to be healing by secondary intention. Unfortunately the cut was above a joint wound edges did not approximate well. Patient is having some pain and stiffness in bending finger but normal strength and sensation, recommended follow up with [facility] for possible intervention  See [facility] hand for possible intervention  Call with any infection signs-described these to patient          Follow up with [facility], otherwise as needed    No follow-ups on file.    Davis Hayes, NICOL  [facility]    Kathleen Renner is a 52 year old, presenting for the following health issues:  Hand Injury (Cut middle finger on right hand two weeks ago, does not like the way it is healing and wondering if there are any options to help with this )      HPI     Patient had a cut on his R 3rd finger, PIP joint 2 weeks ago. Patient fell in the garage and cut his hand on the floor. Cleaned the area, put a bandage on it, used Bacitracin ointment. Was healing well, now concerned the area isn't healing as well. No fevers/chills. No pain or ROM issues with the hand. No strength deficits. He doesn't like how it looks with healing.       Review of Systems   Constitutional, HEENT, cardiovascular, pulmonary, gi and gu systems are negative, except as otherwise noted.      Objective    BP (!) 138/106 (BP Location: Right arm, Patient Position: Sitting, Cuff Size: Adult Large)   Pulse 86   Temp 98  F (36.7  C) (Temporal)   Resp 20   Wt 90.7 kg (200 lb)   SpO2 96%   BMI 28.29 kg/m    Body mass index is 28.29 kg/m .  Physical Exam   GENERAL: healthy, alert and no distress  RESP: lungs clear to auscultation - no rales, rhonchi or wheezes  CV: regular rate and rhythm, normal S1 S2, no S3 or S4, no murmur, click or rub, no peripheral edema and peripheral pulses strong  ABDOMEN: soft, nontender, no hepatosplenomegaly, no  masses and bowel sounds normal  MS: R 3rd finger: PIP joint-approx 3mm gap noted in skin when flexing finger. Pain on flexing finger. No infection signs noted. Wound healing with scar tissue. Normal ROM, sensation and strength.  SKIN: no suspicious lesions or rashes

## 2023-04-21 ENCOUNTER — OFFICE VISIT (OUTPATIENT)
Dept: FAMILY MEDICINE | Facility: CLINIC | Age: 53
End: 2023-04-21

## 2023-04-21 VITALS
BODY MASS INDEX: 28.15 KG/M2 | SYSTOLIC BLOOD PRESSURE: 136 MMHG | OXYGEN SATURATION: 96 % | RESPIRATION RATE: 20 BRPM | DIASTOLIC BLOOD PRESSURE: 92 MMHG | TEMPERATURE: 97.6 F | WEIGHT: 199 LBS | HEART RATE: 90 BPM

## 2023-04-21 DIAGNOSIS — M54.50 ACUTE BILATERAL LOW BACK PAIN WITHOUT SCIATICA: Primary | ICD-10-CM

## 2023-04-21 PROCEDURE — 72100 X-RAY EXAM L-S SPINE 2/3 VWS: CPT | Performed by: STUDENT IN AN ORGANIZED HEALTH CARE EDUCATION/TRAINING PROGRAM

## 2023-04-21 PROCEDURE — 99213 OFFICE O/P EST LOW 20 MIN: CPT | Performed by: STUDENT IN AN ORGANIZED HEALTH CARE EDUCATION/TRAINING PROGRAM

## 2023-04-21 NOTE — PATIENT INSTRUCTIONS
Ibuprofen 600mg 3x/day as needed for pain    Continue heating pad as needed     Massage can help, let me know if you change your mind about physical therapy

## 2023-04-21 NOTE — NURSING NOTE
Chief Complaint   Patient presents with     Back Pain     Lower back pain that has been around for the past week now, no known injury but was on vacation and did a lot of heavy lifting, feels that this caused the pain, pain is staying consistent but when sleeping is the worst        Pre-visit Screening:  Immunizations:  up to date  Colonoscopy:  is due and to be scheduled by patient for later completion  Mammogram: CITLALY  Asthma Action Test/Plan:    PHQ9:  NA  GAD7:  NA  Questioned patient about current smoking habits Pt. quit smoking some time ago.  Ok to leave detailed message on voice mail for today's visit only yes, phone # [phone]

## 2023-04-21 NOTE — PROGRESS NOTES
ASSESSMENT & PLAN      ICD-10-CM    1. Acute bilateral low back pain without sciatica  M54.50 XR Lumbar Spine G/E 4 Views         XRs obtained and reviewed with patient. No red flags on hx or exam. Recommend PT, patient declines. Will increase NSAIDs dose and cont home sx mgmt. Testicular sx likely radicular, also had groin pain with past back pain episodes. If back/testicular pain doesn't resolve within 2-3 weeks will follow-up to discuss further eval.     Patient Instructions   Ibuprofen 600mg 3x/day as needed for pain    Continue heating pad as needed     Massage can help, let me know if you change your mind about physical therapy           -----    SUBJECTIVE  Zurdo Navas is a/an 52 year old male who is seen for evaluation of     Chief Complaint   Patient presents with     Back Pain     Lower back pain that has been around for the past week now, no known injury but was on vacation and did a lot of heavy lifting, feels that this caused the pain, pain is staying consistent but when sleeping is the worst        The patient is seen by themselves.    Date of Onset: 1 week ago  Mechanism of injury: Patient describes injury as lifting suitcases on vacation  Location of Pain: right low back  Worsened by: hard to get comfortable in bed  Better with: advil duo  Treatments tried: rest/activity avoidance  Associated symptoms: no distal numbness or tingling; denies swelling or warmth    Has been having R testicular pain last few days as well (started 2d after back). No urinary sx or STD exposure. No kidney stone hx.     Orthopedic/Surgical history: similar episode  Yrs ago, resolved with muscle relaxer  Social History/Occupation:     Patient's PMH, PSH, and family hx reviewed.      OBJECTIVE:  BP (!) 136/92 (BP Location: Left arm, Patient Position: Sitting, Cuff Size: Adult Large)   Pulse 90   Temp 97.6  F (36.4  C) (Temporal)   Resp 20   Wt 90.3 kg (199 lb)   SpO2 96%   BMI 28.15 kg/m     Alert,  NAD  NC/AT  Sclerae anicteric  Regular  Resp nonlabored  Skin warm and dry  No focal neuro deficits. Speech intact  Appropriate affect    Normal male genitalia, R testicle without mass or tenderness.   Full lumbar ROM, no midline ttp. Tender over R parasp mm only.  Neg SI testing  5/5 str, SILT BLE  Normal gait    RADIOLOGY:  Results for orders placed or performed in visit on 04/21/23   XR Lumbar Spine G/E 4 Views     Status: None    Narrative    Radiologist Consultation/:   Fax:  [phone]  [phone]  _____________________________________________________________________________________________________________________________________________________________________________________________________________________________________________________________________________________________________________________________________________________________________________________________________________________________________________________________________________________________________  PATIENT NAME: CHARRAN, MAGED L  YOB: 1970 Age: 52 ACCESSION NUMBER: [id number]  SEX: M ORDERING PROVIDER: Carlos Gurjit  FACILITY: [facility] PRIMARY PROVIDER:  PATIENT ID: [id number] INTERESTED PARTY:  Page 1 of 1  _________________________________________________________________________________________________________________________________________________________________________________________________________________________________________________________________________________________________________________________________________________________________________________________  EXAM: XRAY LUMBAR SPINE-4V OR MORE  LOCATION: [facility]  DATE/TIME: 4/21/2023 12:38 PM CDT  INDICATION: Low back pain.  COMPARISON: None.  IMPRESSION:  Mild multilevel spondylosis.  No radiographic acute fracture. Vertebral heights maintained. No significant subluxations.  Bilateral sacroiliac  joints intact.  Radiopaque objects project over the pelvis likely external to patient. Please correlate clinically.  SIGNED BY: Carlos Thibodeaux MD 4/22/2023 11:43 AM

## 2023-05-01 ENCOUNTER — OFFICE VISIT (OUTPATIENT)
Dept: FAMILY MEDICINE | Facility: CLINIC | Age: 53
End: 2023-05-01

## 2023-05-01 VITALS
BODY MASS INDEX: 27.86 KG/M2 | SYSTOLIC BLOOD PRESSURE: 132 MMHG | WEIGHT: 199 LBS | HEIGHT: 71 IN | RESPIRATION RATE: 20 BRPM | DIASTOLIC BLOOD PRESSURE: 84 MMHG | TEMPERATURE: 97.2 F | HEART RATE: 84 BPM

## 2023-05-01 DIAGNOSIS — N50.82 SCROTAL PAIN: ICD-10-CM

## 2023-05-01 DIAGNOSIS — Z12.11 SPECIAL SCREENING FOR MALIGNANT NEOPLASMS, COLON: ICD-10-CM

## 2023-05-01 DIAGNOSIS — M54.50 ACUTE BILATERAL LOW BACK PAIN WITHOUT SCIATICA: Primary | ICD-10-CM

## 2023-05-01 PROCEDURE — 99213 OFFICE O/P EST LOW 20 MIN: CPT | Performed by: FAMILY MEDICINE

## 2023-05-01 NOTE — PROGRESS NOTES
"SUBJECTIVE:   Zurdo Navas is a 52 year old male who complains of low back pain for 3 weeks, positional with bending or lifting, with radiation down the right leg slightly and into right scrotum at times-no dysuria, no penile d/c Precipitating factors: lifting suitcase on vacation. Prior history of back problems: recurrent self limited episodes of low back pain in the past. There is no numbness in the legs.    Pt was seen by DR Cagle 4/23- xrays neg, suggested PT but declined, has kaila using Advil duo most days    No fecal incontinence, saddle numbness, fever, or weakness.     OBJECTIVE:  /84 (BP Location: Right arm, Patient Position: Chair, Cuff Size: Adult Regular)   Pulse 84   Temp 97.2  F (36.2  C) (Temporal)   Resp 20   Ht 1.791 m (5' 10.5\")   Wt 90.3 kg (199 lb)   BMI 28.15 kg/m     Patient appears to be in mild pain, no antalgic gait noted. Lumbosacral spine area reveals no local tenderness or mass.  Minimal Painful and reduced LS ROM noted. Straight leg raise is negative at 70 degrees on both sides. DTR's, motor strength and sensation normal, including heel and toe gait.  Peripheral pulses are palpable. X-Ray: neg.     exam, no scortal or testicular TTP, no hernia appreciated    ASSESSMENT:   lumbar strain, again suggest PT but he declines for now- may consider if still bothers in next few weeks    Unclear what causes intermittent scrotal pain-no signs epididymitis, ? Passing a stone ?    PLAN:recommend PT but declines for now, do home stretches, nsaids judiciously with food, rest    If scrotal pain returns come in while pain peresent so we can better evaluate   Consider Physical Therapy and XRay studies if not improving. Call or return to clinic prn if these symptoms worsen or fail to improve as anticipated.    "

## 2023-05-01 NOTE — NURSING NOTE
Zurdo Navas is here for a recheck of his back pain    Questioned patient about current smoking habits.  Pt. quit smoking some time ago.  PULSE regular  My Chart:   CLASSIFICATION OF OVERWEIGHT AND OBESITY BY BMI                        Obesity Class           BMI(kg/m2)  Underweight                                    < 18.5  Normal                                         18.5-24.9  Overweight                                     25.0-29.9  OBESITY                     I                  30.0-34.9                             II                 35.0-39.9  EXTREME OBESITY             III                >40                            Patient's  BMI Body mass index is 28.15 kg/m .  http://hin.nhlbi.nih.gov/menuplanner/menu.cgi  Pre-visit planning  Immunizations - up to date  Colonoscopy - is due and to be scheduled by patient for later completion  Mammogram -   Asthma -   PHQ9 -    LOLY-7 -      The patient has verbalized that it is ok to leave a detailed voice message on the patient's cell phone with results/recommendations from this visit.

## 2023-05-02 ENCOUNTER — TELEPHONE (OUTPATIENT)
Dept: FAMILY MEDICINE | Facility: CLINIC | Age: 53
End: 2023-05-02

## 2023-05-02 NOTE — TELEPHONE ENCOUNTER
Recommend Advil and Tylenol alternating    I will print letter    Recommend PT if not improving    If testicular pain worsens needs OV

## 2023-05-02 NOTE — LETTER
[facility]  [address], [address]  [place]  [address]    May 2, 2023        RE: Zurdo Navas  [address] [facility] [address]        To Whom It May Concern,   The above named patient was seen at this clinic for acute visit 5/1/23. Please excuse any absences .        REX Johansen M.D.          If you have any further questions or problems, please contact our office at [phone].

## 2023-05-02 NOTE — TELEPHONE ENCOUNTER
Pt called stating his back pain is still there. He stated the pain comes and goes, it is still in his back and then also testicular pain. He is wondering what he should do to get some relief.    He also needs a letter stating he had to miss work Sunday night, Monday night, and he is unsure when he is able to go back.    Please advise # [phone]    Thanks, Jessica HSU

## 2024-01-23 ENCOUNTER — OFFICE VISIT (OUTPATIENT)
Dept: FAMILY MEDICINE | Facility: CLINIC | Age: 54
End: 2024-01-23

## 2024-01-23 VITALS
HEART RATE: 112 BPM | WEIGHT: 201 LBS | OXYGEN SATURATION: 96 % | TEMPERATURE: 100.5 F | DIASTOLIC BLOOD PRESSURE: 84 MMHG | BODY MASS INDEX: 28.43 KG/M2 | SYSTOLIC BLOOD PRESSURE: 128 MMHG

## 2024-01-23 DIAGNOSIS — M25.562 LEFT KNEE PAIN, UNSPECIFIED CHRONICITY: Primary | ICD-10-CM

## 2024-01-23 DIAGNOSIS — R50.81 FEVER IN OTHER DISEASES: ICD-10-CM

## 2024-01-23 LAB
COVID-19: NEGATIVE
FLUAV AG UPPER RESP QL IA.RAPID: NORMAL
FLUBV AG UPPER RESP QL IA.RAPID: NORMAL

## 2024-01-23 PROCEDURE — 87804 INFLUENZA ASSAY W/OPTIC: CPT | Performed by: FAMILY MEDICINE

## 2024-01-23 PROCEDURE — 87804 INFLUENZA ASSAY W/OPTIC: CPT | Mod: 59 | Performed by: FAMILY MEDICINE

## 2024-01-23 PROCEDURE — 99214 OFFICE O/P EST MOD 30 MIN: CPT | Performed by: FAMILY MEDICINE

## 2024-01-23 PROCEDURE — 87635 SARS-COV-2 COVID-19 AMP PRB: CPT | Performed by: FAMILY MEDICINE

## 2024-01-23 NOTE — PROGRESS NOTES
"Assessment & Plan   Problem List Items Addressed This Visit    None  Visit Diagnoses       Left knee pain, unspecified chronicity    -  Primary    Fever in other diseases        Relevant Orders    Influenza A and B (B[facility]) (Completed)    COVID-19 (B[facility]) (Completed)           1. Left knee pain, unspecified chronicity  No signs of infection or cellulitis. Recheck if persistent or any changes.    2. Fever in other diseases  Uri, likely viral. Negative testing here today. This could be early covid, advised to continue checking at home daily, followup in clinic or urgent care if changes or worsening symptoms.  - Influenza A and B (BFP)  - COVID-19 (BFP)            BMI  Estimated body mass index is 28.43 kg/m  as calculated from the following:    Height as of 5/1/23: 1.791 m (5' 10.5\").    Weight as of this encounter: 91.2 kg (201 lb).         FUTURE APPOINTMENTS:       - Follow-up visit as needed.    No follow-ups on file.    Omayra Canas MD  [facility]    Subjective     Nursing Notes:   Jessica Armijo CMA  1/23/2024 11:44 AM  Signed  Chief Complaint   Patient presents with    Knee Pain     Left knee pain for the last few days, painful when bearing weight down on it     URI     Fever started last night, runny nose, headache, body aches/ chills      Pre-visit Screening:  Immunizations:  not up to date - shingrix at pharmacy  Colonoscopy:  is due and to be scheduled by patient for later completion, previously ordered  Mammogram: NA  Asthma Action Test/Plan:  NA  PHQ9:  NA  GAD7:  NA  Questioned patient about current smoking habits Pt. quit smoking some time ago.  Ok to leave detailed message on voice mail for today's visit only Yes, phone # [phone]       Zurdo Navas is a 53 year old male who presents to clinic today for the following health issues   HPI     Here with left knee pain for 2 days. No injury or anything that happened to the knee. Years ago had problems with this knee.    But also " has bodyaches and fever, started last night. Negative home covid test this morning. Also sore throat.         Review of Systems   Constitutional, HEENT, cardiovascular, pulmonary, gi and gu systems are negative, except as otherwise noted.      Objective    /84 (BP Location: Right arm, Patient Position: Sitting, Cuff Size: Adult Large)   Pulse 112   Temp (!) 100.5  F (38.1  C) (Temporal)   Wt 91.2 kg (201 lb)   SpO2 96%   BMI 28.43 kg/m    Body mass index is 28.43 kg/m .  Physical Exam   GENERAL: alert and no distress  RESP: lungs clear to auscultation - no rales, rhonchi or wheezes  CV: regular rate and rhythm, normal S1 S2, no S3 or S4, no murmur, click or rub, no peripheral edema  MS: no gross musculoskeletal defects noted, no edema  NEURO: Normal strength and tone, mentation intact and speech normal  PSYCH: mentation appears normal, affect normal/bright  Oroparynx: red and inflammed, no exudate or lesions  Left knee; normal rom, no redness or swelling    Results for orders placed or performed in visit on 01/23/24   Influenza A and B (BFP)     Status: None   Result Value Ref Range    Influenza A neg neg    Influenza B neg neg   COVID-19 (BFP)     Status: None   Result Value Ref Range    COVID-19 Negative

## 2024-01-23 NOTE — LETTER
Re: Zurdo Navas  1970    To Whomever it may concern:    I saw Zurdo in clinic yesterday, he was not feeling well and reports that  Today he is still feeling ill.  He should be excused from work also today and tomorrow.    Omayra Canas MD

## 2024-01-23 NOTE — LETTER
Re: Zurdo Navas    To whomever it may concern:    He was seen in clinic today, is ill. He will not be coming in to work today.    Omayra Canas MD

## 2024-01-23 NOTE — NURSING NOTE
Chief Complaint   Patient presents with    Knee Pain     Left knee pain for the last few days, painful when bearing weight down on it     URI     Fever started last night, runny nose, headache, body aches/ chills      Pre-visit Screening:  Immunizations:  not up to date - shingrix at pharmacy  Colonoscopy:  is due and to be scheduled by patient for later completion, previously ordered  Mammogram: NA  Asthma Action Test/Plan:  NA  PHQ9:  NA  GAD7:  NA  Questioned patient about current smoking habits Pt. quit smoking some time ago.  Ok to leave detailed message on voice mail for today's visit only Yes, phone # [phone]

## 2024-01-24 ENCOUNTER — TELEPHONE (OUTPATIENT)
Dept: FAMILY MEDICINE | Facility: CLINIC | Age: 54
End: 2024-01-24

## 2024-01-24 NOTE — TELEPHONE ENCOUNTER
Pt called asking for a new letter to be written allowing him to miss work today and tomorrow. He works nights. He stated he still has a fever, body aches and chills. Does not feel he can go back to work at this time.    Please advise # [phone]    Thanks, Jessica HSU

## 2024-02-29 DIAGNOSIS — R21 RASH AND NONSPECIFIC SKIN ERUPTION: ICD-10-CM

## 2024-03-01 RX ORDER — TRIAMCINOLONE ACETONIDE 1 MG/G
OINTMENT TOPICAL 2 TIMES DAILY
COMMUNITY
Start: 2024-03-01

## 2024-03-01 NOTE — TELEPHONE ENCOUNTER
Received incoming refill request for  Pending Prescriptions:                       Disp   Refills    triamcinolone (KENALOG) 0.1 % external oi*30 g   1            Sig: APPLY TO AFFECTED AREA TWICE A DAY    This was last prescribed in 4/2022 for a rash. Denied and sent back to pharmacy with message to have patient reach out to us since we have not prescribed this in a long time.

## 2024-03-07 ENCOUNTER — OFFICE VISIT (OUTPATIENT)
Dept: FAMILY MEDICINE | Facility: CLINIC | Age: 54
End: 2024-03-07

## 2024-03-07 VITALS
HEIGHT: 71 IN | BODY MASS INDEX: 28.14 KG/M2 | TEMPERATURE: 97.5 F | HEART RATE: 88 BPM | SYSTOLIC BLOOD PRESSURE: 126 MMHG | DIASTOLIC BLOOD PRESSURE: 82 MMHG | WEIGHT: 201 LBS | RESPIRATION RATE: 20 BRPM

## 2024-03-07 DIAGNOSIS — R21 RASH AND NONSPECIFIC SKIN ERUPTION: ICD-10-CM

## 2024-03-07 PROCEDURE — 99213 OFFICE O/P EST LOW 20 MIN: CPT | Performed by: FAMILY MEDICINE

## 2024-03-07 RX ORDER — TRIAMCINOLONE ACETONIDE 1 MG/G
OINTMENT TOPICAL 2 TIMES DAILY
Qty: 30 G | Refills: 1 | Status: SHIPPED | OUTPATIENT
Start: 2024-03-07

## 2024-03-07 NOTE — NURSING NOTE
Zurdo Navas is here for his rash. Had a fever a couple weeks ago and got his rash back. Would like a refill of his triamcinolone.    Questioned patient about current smoking habits.  Pt. quit smoking some time ago.  PULSE regular  My Chart:   CLASSIFICATION OF OVERWEIGHT AND OBESITY BY BMI                        Obesity Class           BMI(kg/m2)  Underweight                                    < 18.5  Normal                                         18.5-24.9  Overweight                                     25.0-29.9  OBESITY                     I                  30.0-34.9                             II                 35.0-39.9  EXTREME OBESITY             III                >40                            Patient's  BMI Body mass index is 28.43 kg/m .  http://hin.nhlbi.nih.gov/menuplanner/menu.cgi  Pre-visit planning  Immunizations - up to date  Colonoscopy - due  Mammogram -   Asthma -   PHQ9 -    LOLY-7 -      The patient has verbalized that it is ok to leave a detailed voice message on the patient's cell phone with results/recommendations from this visit.

## 2024-03-07 NOTE — PROGRESS NOTES
SUBJECTIVE: Zurdo Navas is a 53 year old male who presents for rash left upper thigh, itchy x a week, seems to spread a but to right leg.    No fevers    NO exposures.    Pt has tried a cream- not sure which-not better    He does have a hx of dermatitis- treated in past with steroid cream    Patient Active Problem List   Diagnosis    Health Care Home    ACP (advance care planning)    Overweight     No past medical history on file.    Family History   Problem Relation Age of Onset    Hypertension Mother     Diabetes Mother     Cerebrovascular Disease Mother     Hypertension Father          age 74    Diabetes Sister     Coronary Artery Disease Brother     Other - See Comments Daughter         cervix hx    Colon Cancer No family hx of     Prostate Cancer No family hx of        Social History     Socioeconomic History    Marital status:      Spouse name: Andria    Number of children: 1    Years of education: 12    Highest education level: Not on file   Occupational History    Occupation: [employer]     Employer: [employer]   Tobacco Use    Smoking status: Former     Packs/day: 0.50     Years: 1.00     Additional pack years: 0.00     Total pack years: 0.50     Types: Cigarettes     Quit date: 1/10/2006     Years since quittin.1     Passive exposure: Past    Smokeless tobacco: Never   Substance and Sexual Activity    Alcohol use: Yes     Alcohol/week: 1.0 standard drink of alcohol     Types: 1 Standard drinks or equivalent per week    Drug use: No    Sexual activity: Yes     Partners: Female     Birth control/protection: Condom   Other Topics Concern     Service No    Blood Transfusions No    Caffeine Concern No    Occupational Exposure No    Hobby Hazards No    Sleep Concern No    Stress Concern No    Weight Concern No    Special Diet No    Back Care No    Exercise No    Bike Helmet Not Asked    Seat Belt Yes    Self-Exams Not Asked   Social History Narrative    needs primary  "tetanus diphtheria series, first shot oct 29 2003     Social Determinants of Health     Financial Resource Strain: Not on file   Food Insecurity: Not on file   Transportation Needs: Not on file   Physical Activity: Not on file   Stress: Not on file   Social Connections: Not on file   Interpersonal Safety: Not on file   Housing Stability: Not on file       Past Surgical History:   Procedure Laterality Date    NO HISTORY OF SURGERY       Multiple Vitamins-Minerals (ONE-A-DAY MENS 50+ ADVANTAGE) TABS,   triamcinolone (KENALOG) 0.1 % external ointment, Apply topically 2 times daily    No current facility-administered medications on file prior to visit.       Allergies: No known allergies      Immunization History   Administered Date(s) Administered    COVID-19 MONOVALENT 12+ (Pfizer) 04/01/2021, 04/20/2021, 12/14/2021    TD,PF 7+ (Tenivac) 10/29/2003, 01/26/2004    TDAP Vaccine (Boostrix) 01/14/2015        OBJECTIVE: /82 (BP Location: Right arm, Patient Position: Chair, Cuff Size: Adult Large)   Pulse 88   Temp 97.5  F (36.4  C) (Temporal)   Resp 20   Ht 1.791 m (5' 10.5\")   Wt 91.2 kg (201 lb)   BMI 28.43 kg/m     Skin, erythematous maculopapular rash on upper legs and behind knees    ASSESSMENT: /PLAN:   (R21) Rash and nonspecific skin eruption  Comment: pt with evidence recurrence of eczematous dermatitis  Plan: triamcinolone, I reviewed the risks, benefits, and possible side effects of the medication.  The patient had an opportunity to ask any questions regarding the treatment plan. The patient was encouraged to call my office if any problems.     f/u if not improving or worsening      "

## 2024-04-04 ENCOUNTER — OFFICE VISIT (OUTPATIENT)
Dept: FAMILY MEDICINE | Facility: CLINIC | Age: 54
End: 2024-04-04

## 2024-04-04 VITALS
OXYGEN SATURATION: 95 % | BODY MASS INDEX: 28.7 KG/M2 | TEMPERATURE: 97.9 F | SYSTOLIC BLOOD PRESSURE: 132 MMHG | DIASTOLIC BLOOD PRESSURE: 86 MMHG | HEIGHT: 71 IN | HEART RATE: 80 BPM | WEIGHT: 205 LBS

## 2024-04-04 DIAGNOSIS — R73.03 PREDIABETES: ICD-10-CM

## 2024-04-04 DIAGNOSIS — E78.2 MIXED HYPERLIPIDEMIA: ICD-10-CM

## 2024-04-04 DIAGNOSIS — Z00.00 ROUTINE GENERAL MEDICAL EXAMINATION AT A HEALTH CARE FACILITY: Primary | ICD-10-CM

## 2024-04-04 DIAGNOSIS — Z12.5 SPECIAL SCREENING FOR MALIGNANT NEOPLASM OF PROSTATE: ICD-10-CM

## 2024-04-04 LAB
ALBUMIN SERPL-MCNC: 4.2 G/DL (ref 3.6–5.1)
ALBUMIN/GLOB SERPL: 1.2 {RATIO} (ref 1–2.5)
ALP SERPL-CCNC: 51 U/L (ref 33–130)
ALT 1742-6: 30 U/L (ref 0–32)
AST 1920-8: 18 U/L (ref 0–35)
BILIRUB SERPL-MCNC: 1.2 MG/DL (ref 0.2–1.2)
BUN SERPL-MCNC: 11 MG/DL (ref 7–25)
BUN/CREATININE RATIO: 10.7 (ref 6–32)
CALCIUM SERPL-MCNC: 9.4 MG/DL (ref 8.6–10.3)
CHLORIDE SERPLBLD-SCNC: 100.4 MMOL/L (ref 98–110)
CHOLEST SERPL-MCNC: 206 MG/DL (ref 0–199)
CHOLEST/HDLC SERPL: 4 {RATIO} (ref 0–5)
CO2 SERPL-SCNC: 31.7 MMOL/L (ref 20–32)
CREAT SERPL-MCNC: 1.03 MG/DL (ref 0.6–1.3)
GLOBULIN, CALCULATED - QUEST: 3.5 (ref 1.9–3.7)
GLUCOSE SERPL-MCNC: 88 MG/DL (ref 60–99)
HDLC SERPL-MCNC: 53 MG/DL (ref 40–150)
LDLC SERPL CALC-MCNC: 129 MG/DL (ref 0–130)
POTASSIUM SERPL-SCNC: 3.81 MMOL/L (ref 3.5–5.3)
PROT SERPL-MCNC: 7.7 G/DL (ref 6.1–8.1)
SODIUM SERPL-SCNC: 140.2 MMOL/L (ref 135–146)
TRIGL SERPL-MCNC: 122 MG/DL (ref 0–149)

## 2024-04-04 PROCEDURE — 80053 COMPREHEN METABOLIC PANEL: CPT | Performed by: FAMILY MEDICINE

## 2024-04-04 PROCEDURE — 80061 LIPID PANEL: CPT | Performed by: FAMILY MEDICINE

## 2024-04-04 PROCEDURE — 84153 ASSAY OF PSA TOTAL: CPT | Mod: 90 | Performed by: FAMILY MEDICINE

## 2024-04-04 PROCEDURE — 36415 COLL VENOUS BLD VENIPUNCTURE: CPT | Performed by: FAMILY MEDICINE

## 2024-04-04 PROCEDURE — 99396 PREV VISIT EST AGE 40-64: CPT | Performed by: FAMILY MEDICINE

## 2024-04-04 NOTE — LETTER
April 8, 2024      Zurdo Navas  [address] [place] [address]        Dear ,    We are writing to inform you of your test results.    Zurdo Navas     The results of your recent Blood Sugar, Kidney Tests, Liver Panel, and PSA were within normal limits.     Your recent cholesterol numbers are elevated.  The new guidelines recommend we perform a risk calculation to determine if medications might be warranted. This calculation estimates your risk of a cardiovascular event in the next 10 years.  If the result is over 7.5% risk then statin medication is recommended. Your risk comes in at 5% so no medications needed.    The 10-year ASCVD risk score (Samson GALVEZ, et al., 2019) is: 5.3%    Values used to calculate the score:      Age: 53 years      Sex: Male      Is Non- : No      Diabetic: No      Tobacco smoker: No      Systolic Blood Pressure: 132 mmHg      Is BP treated: No      HDL Cholesterol: 53 mg/dL      Total Cholesterol: 206 mg/dL     The results are now released on My Chart. Please contact me if you have further questions or concerns.    Take care,    REX Johansen M.D.       Resulted Orders   Lipid Panel (BFP)   Result Value Ref Range    Cholesterol 206 (A) 0 - 199 mg/dL    Triglycerides 122 0 - 149 mg/dL    HDL Cholesterol 53 40 - 150 mg/dL    LDL Cholesterol Direct 129 0 - 130 mg/dL    Cholesterol/HDL Ratio 4 0 - 5   Comprehensive Metobolic Panel (BFP)   Result Value Ref Range    Carbon Dioxide 31.7 20 - 32 mmol/L    Creatinine 1.03 0.60 - 1.30 mg/dL    Glucose 88 60 - 99 mg/dL    Sodium 140.2 135 - 146 mmol/L    Potassium 3.81 3.5 - 5.3 mmol/L    Chloride 100.4 98 - 110 mmol/L    Protein Total 7.7 6.1 - 8.1 g/dL    Albumin 4.2 3.6 - 5.1 g/dL    Alkaline Phosphatase 51 33 - 130 U/L    ALT 30 0 - 32 U/L    AST 18 0 - 35 U/L    Bilirubin Total 1.2 0.2 - 1.2 mg/dL    Urea Nitrogen 11 7 - 25 mg/dL    Calcium 9.4 8.6 - 10.3 mg/dL    BUN/Creatinine Ratio  10.7 6 - 32    Globulin Calculated 3.5 1.9 - 3.7    A/G Ratio 1.2 1 - 2.5   PSA Total ([facility])   Result Value Ref Range    Abbott PSA 2.46 < OR = 4.00 ng/mL      Comment:      The total PSA value from this assay system is   standardized against the WHO standard. The test   result will be approximately 20% lower when compared   to the equimolar-standardized total PSA (Shantel   [place]). Comparison of serial PSA results should be   interpreted with this fact in mind.     This test was performed using the Siemens   chemiluminescent method. Values obtained from   different assay methods cannot be used  interchangeably. PSA levels, regardless of  value, should not be interpreted as absolute  evidence of the presence or absence of disease.         If you have any questions or concerns, please call the clinic at the number listed above.       Sincerely,      Toñito Johansen MD

## 2024-04-04 NOTE — PROGRESS NOTES
3  SUBJECTIVE:   CC: Zurdo Navas is an 53 year old male who presents for preventive health visit.       Patient has been advised of split billing requirements and indicates understanding: Yes  Healthy Habits:  Do you get at least three servings of calcium containing foods daily (dairy, green leafy vegetables, etc.)? yes  Amount of exercise or daily activities, outside of work: a few day(s) per week  Problems taking medications regularly No  Medication side effects: No  Have you had an eye exam in the past two years? yes  Do you see a dentist twice per year? yes  Do you have sleep apnea, excessive snoring or daytime drowsiness?no          Today's PHQ-2 Score:       2024    11:38 AM 3/6/2023     1:59 PM   PHQ-2 (  Pfizer)   Q1: Little interest or pleasure in doing things 0 0   Q2: Feeling down, depressed or hopeless 0 0   PHQ-2 Score 0 0       Abuse: Current or Past(Physical, Sexual or Emotional)- No  Do you feel safe in your environment? Yes        Social History     Tobacco Use    Smoking status: Former     Packs/day: 0.50     Years: 1.00     Additional pack years: 0.00     Total pack years: 0.50     Types: Cigarettes     Quit date: 1/10/2006     Years since quittin.2     Passive exposure: Past    Smokeless tobacco: Never   Substance Use Topics    Alcohol use: Yes     Alcohol/week: 1.0 standard drink of alcohol     Types: 1 Standard drinks or equivalent per week     Comment: rare     If you drink alcohol do you typically have >3 drinks per day or >7 drinks per week? No                      Last PSA:   Abbott PSA   Date Value Ref Range Status   2022 2.06 < OR = 4.00 ng/mL Final     Comment:     The total PSA value from this assay system is   standardized against the WHO standard. The test   result will be approximately 20% lower when compared   to the equimolar-standardized total PSA (Shantel   [place]). Comparison of serial PSA results should be   interpreted with this fact in mind.     This  test was performed using the Siemens   chemiluminescent method. Values obtained from   different assay methods cannot be used  interchangeably. PSA levels, regardless of  value, should not be interpreted as absolute  evidence of the presence or absence of disease.         Reviewed orders with patient. Reviewed health maintenance and updated orders accordingly - Yes  BP Readings from Last 3 Encounters:   24 132/86   24 126/82   24 128/84    Wt Readings from Last 3 Encounters:   24 93 kg (205 lb)   24 91.2 kg (201 lb)   24 91.2 kg (201 lb)                  Patient Active Problem List   Diagnosis    Health Care Home    ACP (advance care planning)    Overweight     Past Surgical History:   Procedure Laterality Date    NO HISTORY OF SURGERY         Social History     Tobacco Use    Smoking status: Former     Packs/day: 0.50     Years: 1.00     Additional pack years: 0.00     Total pack years: 0.50     Types: Cigarettes     Quit date: 1/10/2006     Years since quittin.2     Passive exposure: Past    Smokeless tobacco: Never   Substance Use Topics    Alcohol use: Yes     Alcohol/week: 1.0 standard drink of alcohol     Types: 1 Standard drinks or equivalent per week     Comment: rare     Family History   Problem Relation Age of Onset    Hypertension Mother     Diabetes Mother     Cerebrovascular Disease Mother     Hypertension Father          age 74    Diabetes Sister     Coronary Artery Disease Brother     Other - See Comments Daughter         cervix hx    Colon Cancer No family hx of     Prostate Cancer No family hx of          Current Outpatient Medications   Medication Sig Dispense Refill    Multiple Vitamins-Minerals (ONE-A-DAY MENS 50+ ADVANTAGE) TABS       triamcinolone (KENALOG) 0.1 % external ointment Apply topically 2 times daily 30 g 1     Allergies   Allergen Reactions    No Known Allergies      Recent Labs   Lab Test 22  0000 07/10/20  1117 07/10/20  1116  "08/13/18  1120   *  --  122 115*   HDL 47  --  45 38*   TRIG 130  --  143 99   CR 1.01 0.99  --   --    POTASSIUM 4.03 4.29  --   --         Reviewed and updated as needed this visit by clinical staff   Tobacco  Allergies  Meds  Problems             Reviewed and updated as needed this visit by Provider                  History reviewed. No pertinent past medical history.   Past Surgical History:   Procedure Laterality Date    NO HISTORY OF SURGERY         ROS:  CONSTITUTIONAL: NEGATIVE for fever, chills, change in weight  INTEGUMENTARY/SKIN: NEGATIVE for worrisome rashes, moles or lesions  EYES: NEGATIVE for vision changes or irritation  ENT: NEGATIVE for ear, mouth and throat problems  RESP: NEGATIVE for significant cough or SOB  CV: NEGATIVE for chest pain, palpitations or peripheral edema  GI: NEGATIVE for nausea, abdominal pain, heartburn, or change in bowel habits   male: negative for dysuria, hematuria, decreased urinary stream, erectile dysfunction, urethral discharge  MUSCULOSKELETAL: NEGATIVE for significant arthralgias or myalgia  NEURO: NEGATIVE for weakness, dizziness or paresthesias  PSYCHIATRIC: NEGATIVE for changes in mood or affect    OBJECTIVE:   /86 (BP Location: Left arm, Patient Position: Sitting, Cuff Size: Adult Large)   Pulse 80   Temp 97.9  F (36.6  C) (Temporal)   Ht 1.791 m (5' 10.5\")   Wt 93 kg (205 lb)   SpO2 95%   BMI 29.00 kg/m    EXAM:  GENERAL: alert and no distress  EYES: Eyes grossly normal to inspection, PERRL and conjunctivae and sclerae normal  HENT: ear canals and TM's normal, nose and mouth without ulcers or lesions  NECK: no adenopathy, no asymmetry, masses, or scars  RESP: lungs clear to auscultation - no rales, rhonchi or wheezes  CV: regular rate and rhythm, normal S1 S2, no S3 or S4, no murmur, click or rub, no peripheral edema  ABDOMEN: soft, nontender, no hepatosplenomegaly, no masses and bowel sounds normal   (male): normal male genitalia " "without lesions or urethral discharge, no hernia  MS: no gross musculoskeletal defects noted, no edema  SKIN: no suspicious lesions or rashes  NEURO: Normal strength and tone, mentation intact and speech normal  PSYCH: mentation appears normal, affect normal/bright    Diagnostic Test Results:  Labs reviewed in Epic    ASSESSMENT/PLAN:   (Z00.00) Routine general medical examination at a health care facility  (primary encounter diagnosis)  Comment: discussed preventitive healthcare   Plan: Continue to work on healthy diet and exercise, discussed healthy habits     (E78.2) Mixed hyperlipidemia  Comment: recheck labs  Plan: Lipid Panel (BFP), Comprehensive Metobolic         Panel (BFP), VENOUS COLLECTION            (R73.03) Prediabetes  Comment: Continue to work on healthy diet and exercise, discussed healthy habits   Plan: Comprehensive Metobolic Panel (BFP), VENOUS         COLLECTION            (Z12.5) Special screening for malignant neoplasm of prostate  Comment:   Plan: PSA Total ([facility]), VENOUS COLLECTION            Patient has been advised of split billing requirements and indicates understanding: Yes  COUNSELING:  Reviewed preventive health counseling, as reflected in patient instructions       Regular exercise       Healthy diet/nutrition       Vision screening       Immunizations  Declined:  all, also declined colonoscopy             Colorectal cancer screening       Prostate cancer screening    Estimated body mass index is 29 kg/m  as calculated from the following:    Height as of this encounter: 1.791 m (5' 10.5\").    Weight as of this encounter: 93 kg (205 lb).    Weight management plan: Discussed healthy diet and exercise guidelines    He reports that he quit smoking about 18 years ago. His smoking use included cigarettes. He has a 0.5 pack-year smoking history. He has been exposed to tobacco smoke. He has never used smokeless tobacco.      Counseling Resources:  ATP IV Guidelines  Pooled Cohorts Equation " Calculator  FRAX Risk Assessment  ICSI Preventive Guidelines  Dietary Guidelines for Americans, 2010  USDA's MyPlate  ASA Prophylaxis  Lung CA Screening    Toñito Johansen MD  [facility]

## 2024-04-04 NOTE — NURSING NOTE
Chief Complaint   Patient presents with    Physical     Pt here for his CPX. Is fasting.    Pre-visit Screening:  Immunizations:  up to date  Colonoscopy:  is due and to be scheduled by patient for later completion  Mammogram: randolph  Asthma Action Test/Plan:  randolph  PHQ9:  na  GAD7:  na  Questioned patient about current smoking habits Pt. quit smoking some time ago.  Ok to leave detailed message on voice mail for today's visit only yes, phone # [phone]

## 2024-04-05 LAB — ABBOTT PSA - QUEST: 2.46 NG/ML

## 2024-08-09 ENCOUNTER — OFFICE VISIT (OUTPATIENT)
Dept: FAMILY MEDICINE | Facility: CLINIC | Age: 54
End: 2024-08-09

## 2024-08-09 VITALS
DIASTOLIC BLOOD PRESSURE: 98 MMHG | WEIGHT: 206 LBS | OXYGEN SATURATION: 96 % | BODY MASS INDEX: 29.14 KG/M2 | TEMPERATURE: 98.6 F | HEART RATE: 94 BPM | SYSTOLIC BLOOD PRESSURE: 146 MMHG

## 2024-08-09 DIAGNOSIS — Z12.11 SPECIAL SCREENING FOR MALIGNANT NEOPLASMS, COLON: ICD-10-CM

## 2024-08-09 DIAGNOSIS — R03.0 ELEVATED BLOOD PRESSURE READING WITHOUT DIAGNOSIS OF HYPERTENSION: Primary | ICD-10-CM

## 2024-08-09 DIAGNOSIS — K21.9 GASTROESOPHAGEAL REFLUX DISEASE, UNSPECIFIED WHETHER ESOPHAGITIS PRESENT: ICD-10-CM

## 2024-08-09 PROCEDURE — 99213 OFFICE O/P EST LOW 20 MIN: CPT

## 2024-08-09 NOTE — LETTER
August 26, 2024      Zurdo Navas  [address] [facility] [address] [facility] [address]        Dear ,    We are writing to inform you of your test results.    Your test results fall within the expected range(s) or remain unchanged from previous results.  Please continue with current treatment plan. Cologuard negative-good news-repeat in 3 years    Resulted Orders   COLOGUARD(EXACT SCIENCES)   Result Value Ref Range    COLOGUARD-ABSTRACT Negative Negative      Comment:        NEGATIVE TEST RESULT. A negative Cologuard result indicates a low likelihood that a colorectal cancer (CRC) or advanced adenoma (adenomatous polyps with more advanced pre-malignant features)  is present. The chance that a person with a negative Cologuard test has a colorectal cancer is less than 1 in 1500 (negative predictive value >99.9%) or has an  advanced adenoma is less than  5.3% (negative predictive value 94.7%). These data are based on a prospective cross-sectional study of 10,000 individuals at average risk for colorectal cancer who were screened with both Cologuard and colonoscopy. (Ashlee Gruber al, N Engl J Med 2014;370(14):7834-1532) The normal value (reference range) for this assay is negative.    COLOGUARD RE-SCREENING RECOMMENDATION: Periodic colorectal cancer screening is an important part of preventive healthcare for asymptomatic individuals at average risk for colorectal cancer.  Following a negative Cologuard result, the American Cancer Society and U.S.  Multi-Society Task Force screening guidelines recommend a Cologuard re-screening interval of 3 years.   References: American Cancer Society Guideline for Colorectal Cancer Screening: https://www.cancer.org/cancer/colon-rectal-cancer/mdqoediuo-yxatvakzj-quiaers/acs-recommendations.html.; Mick GALVEZ, Kristal VARGAS, Deanna STEPHEN, Colorectal Cancer Screening: Recommendations for Physicians and Patients from the U.S. Multi-Society Task Force on Colorectal Cancer Screening , Am J  Gastroenterology 2017; 112:7783-8013.    TEST DESCRIPTION: Composite algorithmic analysis of stool DNA-biomarkers with hemoglobin immunoassay.   Quantitative values of individual biomarkers are not reportable and are not associated with individual biomarker result reference ranges. Cologuard is intended for colorectal cancer screening of adults of either sex, 45 years or older, who are at average-risk for colorectal cancer (CRC). Cologuard has been approved for use by the U.S. FDA. The performance of Cologuard was  established in a cross sectional study of average-risk adults aged 50-84. Cologuard performance in patients ages 45 to 49 years was estimated by sub-group analysis of near-age groups. Colonoscopies performed for a positive result may find as the most clinically significant lesion: colorectal cancer [4.0%], advanced adenoma (including sessile serrated polyps greater than or equal to 1cm diameter) [20%] or non- advanced adenoma [31%]; or no colorectal neoplasia [45%]. These estimates are derived from a prospective cross-sectional screening study of 10,000 individuals at average risk for colorectal cancer who were screened with both Cologuard and colonoscopy. (Ashlee Gruber al, N Engl J Med 2014;370(14):2440-0510.) Cologuard may produce a false negative or false positive result (no colorectal cancer or precancerous polyp present at colonoscopy follow up). A negative Cologuard test result does not guarantee the absence of CRC or advanced adenoma (pre-cancer). The current Cologuard  screening interval is every 3 years. (American Cancer Society and U.S. Multi-Society Task Force). Cologuard performance data in a 10,000 patient pivotal study using colonoscopy as the reference method can be accessed at the following location: www.[employer]/results. Additional description of the Cologuard test process, warnings and precautions can be found at www.ponUp.com.         If you have any questions or concerns,  please call the clinic at the number listed above.       Sincerely,      Brenda Goss PA-C

## 2024-08-09 NOTE — PROGRESS NOTES
Assessment & Plan     1. Elevated blood pressure reading without diagnosis of hypertension  - Elevated today, discussed with Zurdo and his wife BP goal is <130/80. Recommend Zurdo continue to check his BP daily for next month and keep a log of his values and follow up in a month for a recheck. If BP's consistently >130/80 then can discuss medication options. Return to clinic and ER precautions discussed.     2. Gastroesophageal reflux disease, unspecified whether esophagitis present  - Symptoms consistent with GERD. RX for Omeprazole 20 mg daily sent for one month, side effects reviewed. He will follow up in a month for a recheck. Encouraged limiting spicy foods from diet and staying up right for at least 3 hours after evening meal.   - omeprazole (PRILOSEC) 20 MG DR capsule; Take 1 capsule (20 mg) by mouth daily in the morning one hour before breakfast  Dispense: 30 capsule; Refill: 0    3. Special screening for malignant neoplasms, colon  - Order for cologuard placed.   - COLOGUARD(EXACT SCIENCES)    Follow up in one month for a medication and BP recheck. Reasons to follow-up sooner or seek emergent care reviewed.     Brenda Goss PA-C  [facility]       Subjective     Zurdo Navas is a 53 year old male who presents to clinic today for the following health issues:    HPI   Chief Complaint   Patient presents with    Hypertension     Elevated BP readings at home 148/101      Zurdo presents with his wife with concerns of high blood pressure. He does not have a history of high blood pressure.however notes the past few days his blood pressure has been high when he has checked it at home. He notes this morning and yesterday it was around 148/101 however notes when he went to the dentist today was told it was normal. He notes the past week or so he has started to check his BP and notes it has been around 150's/90's. His wife also notes that Zurdo has been under more stress recently; his mother passed  away 3 months ago and and there have been more stress with work. He denies any SI/HI. He denies any symptoms of chest pain, SOB, palpitations, or swelling in his legs. He does not follow a low salt diet and does not exercise. There is a significant family history of HTN.     Zurdo also notes he has had symptoms of acid reflux for many years and would like to try a medication to help with his symptoms. He notes he has daily symptoms regardless of what he eats of sour taste in his mouth, burping, and upper abdominal discomfort. He does note he eats a lot of spicy foods and typically eats dinner and goes straight to bed as he works late at night. He denies any symptoms of pain or difficulty swallowing along with no nausea/vomiting, constipation, or diarrhea.     Patient does not take any daily medications.       Objective    BP (!) 146/98 (BP Location: Right arm, Patient Position: Sitting, Cuff Size: Adult Large)   Pulse 94   Temp 98.6  F (37  C) (Temporal)   Wt 93.4 kg (206 lb)   SpO2 96%   BMI 29.14 kg/m    Body mass index is 29.14 kg/m .    Physical Examination:  GENERAL: healthy, alert and no distress  EYES: Eyes grossly normal to inspection, PERRL and conjunctivae and sclerae normal  HENT: mouth without ulcers or lesions  NECK: no adenopathy, no asymmetry, masses, or scars and thyroid normal to palpation  RESP: lungs clear to auscultation - no rales, rhonchi or wheezes  CV: regular rate and rhythm, normal S1 S2, no S3 or S4, no murmur, click or rub, no peripheral edema   ABDOMEN: soft and non-tender  MS: no gross musculoskeletal defects noted, no edema  SKIN: no suspicious lesions or rashes  PSYCH: mentation appears normal, affect normal/bright

## 2024-08-09 NOTE — NURSING NOTE
Chief Complaint   Patient presents with    Hypertension     Elevated BP readings at home 148/101     Pre-visit Screening:  Immunizations:  not up to date - shingrix at pharmacy  Colonoscopy:  is due and to be scheduled by patient for later completion  Mammogram: NA  Asthma Action Test/Plan:  NA  PHQ9:  CITLALY  GAD7:  CITLALY  Questioned patient about current smoking habits Pt. quit smoking some time ago.  Ok to leave detailed message on voice mail for today's visit only Yes, phone # [phone]

## 2024-08-21 LAB — NONINV COLON CA DNA+OCC BLD SCRN STL QL: NEGATIVE

## 2024-09-13 ENCOUNTER — OFFICE VISIT (OUTPATIENT)
Dept: FAMILY MEDICINE | Facility: CLINIC | Age: 54
End: 2024-09-13

## 2024-09-13 VITALS
SYSTOLIC BLOOD PRESSURE: 158 MMHG | BODY MASS INDEX: 29.93 KG/M2 | HEART RATE: 76 BPM | TEMPERATURE: 97.5 F | OXYGEN SATURATION: 97 % | DIASTOLIC BLOOD PRESSURE: 98 MMHG | WEIGHT: 211.6 LBS

## 2024-09-13 DIAGNOSIS — K21.9 GASTROESOPHAGEAL REFLUX DISEASE, UNSPECIFIED WHETHER ESOPHAGITIS PRESENT: ICD-10-CM

## 2024-09-13 DIAGNOSIS — I10 ESSENTIAL HYPERTENSION: Primary | ICD-10-CM

## 2024-09-13 PROCEDURE — 99213 OFFICE O/P EST LOW 20 MIN: CPT

## 2024-09-13 RX ORDER — LOSARTAN POTASSIUM 50 MG/1
50 TABLET ORAL DAILY
Qty: 30 TABLET | Refills: 0 | Status: SHIPPED | OUTPATIENT
Start: 2024-09-13

## 2024-09-13 NOTE — NURSING NOTE
Chief Complaint   Patient presents with    Hypertension     Follow up for BP after visit last month, has been consistently high while monitoring at home.      Pre-visit Screening:  Immunizations:  not up to date - pt declined flu shot   Colonoscopy:  is up to date  Mammogram: na  Asthma Action Test/Plan:  na  PHQ9:  na  GAD7:  na  Questioned patient about current smoking habits Pt. quit smoking some time ago.  Ok to leave detailed message on voice mail for today's visit only yes, phone # [phone] (home)        Todd is a 6 year old male with medulloblastoma currently enrolled on Headstart IV feeling well now having completed Cycle 1 chemotherapy. He is s/p stem cell harvest on Aug 25, 2020.He began on cycle 2 chemotherapy on Aug 27.  He received MTX 11,500mg IV over 4 hours on day 4 (8/30/2020), followed by post-chemotherapy hydration. He continues to meet post MTX infusion UOP goal of >95mL/Hr.    His 72 MTX level was MTX= 0.3; Goal <0.1. Will continue with Q24H MTX levels until goal of <0.1 is reached. MTX level at 24 hour= 9.72; goal <10. MTX Level at 48 hrs= .75; goal <1. MTX levels will be drawn every 24 hours until MTX level reaches goal of <0.1. Once MTX clear- start Neupogen     He is complaining of continued abdominal pain in the epigastric region with relief with IV Morphine Q3H.  Since 8/31/2020 he had no stools. Is due for a dose on vincristine today (9/3/2020). Lactulose   Previously had frequent loose stools; but has not stooled at all for three days. No suspicion for C Diff at this time. Will remove contact precautions and watch for reoccurrence of frequent/loose stools.     Mucositis is worsening following infusion of high dose MTX as evident by more pronounced scalloping on the posterior buccal mucosa and increased oral secretions NG tube in place, recieving continuos NGT feeds at 65mL/Hr    Received IV pentamidine Aug 26, next due Sep 9. Todd is a 6 year old male with medulloblastoma currently enrolled on Headstart IV feeling well now having completed Cycle 1 chemotherapy. He is s/p stem cell harvest on Aug 25, 2020.He began on cycle 2 chemotherapy on Aug 27.  He received MTX 11,500mg IV over 4 hours on day 4 (8/30/2020), followed by post-chemotherapy hydration. He continues to meet post MTX infusion UOP goal of >95mL/Hr.    His 72 MTX level was MTX= 0.3; Goal <0.1. Will continue with Q24H MTX levels until goal of <0.1 is reached. MTX level at 24 hour= 9.72; goal <10. MTX Level at 48 hrs= .75; goal <1. MTX levels will be drawn every 24 hours until MTX level reaches goal of <0.1. Once MTX clear- start Neupogen     He is complaining of continued abdominal pain in the epigastric region with moderate relief with IV Morphine Q3H.  Since 8/31/2020 he had no stools. Is due for a dose on vincristine today (9/3/2020). He received one dose of prn Miralax and prn lactose yesterday without relief. Given one PO dose of naloxone and Miralax changed to BID.     Mucositis is worsening following infusion of high dose MTX as evident by more pronounced scalloping on the posterior buccal mucosa and increased oral secretions NG tube in place, receiving continuos NGT feeds at 65mL/Hr.     Received IV pentamidine Aug 26, next due Sep 9. Todd is a 6 year old male with medulloblastoma currently enrolled on Headstart IV feeling well now having completed Cycle 1 chemotherapy. He is s/p stem cell harvest on Aug 25, 2020.He began on cycle 2 chemotherapy on Aug 27.  He received MTX 11,500mg IV over 4 hours on day 4 (8/30/2020), followed by post-chemotherapy hydration. He continues to meet post MTX infusion UOP goal of >95mL/Hr.    His 72 MTX level was MTX= 0.3; Goal <0.1. Will continue with Q24H MTX levels until goal of <0.1 is reached. MTX level at 24 hour= 9.72; goal <10. MTX Level at 48 hrs= .75; goal <1. MTX levels will be drawn every 24 hours until MTX level reaches goal of <0.1. Once MTX clear- start Neupogen     He is complaining of continued abdominal pain in the epigastric region with moderate relief with IV Morphine Q3H.  Since 8/31/2020 he had no stools. Is due for a dose on vincristine today (9/3/2020). He received one dose of prn Miralax and prn lactose yesterday without relief. Given one PO dose of naloxone and Miralax changed to BID.     Mucositis is worsening following infusion of high dose MTX as evident by more pronounced scalloping on the posterior buccal mucosa and increased oral secretions NG tube in place, was previously receiving continuos NGT feeds at 65mL/Hr, however a couple episodes of vomiting. NGT paused until 1800 on 9/3/2020. Will restart at 30mL/hr and slowly increase by 10mL Q6H until goal of 65m;/hr.       Received IV pentamidine Aug 26, next due Sep 9. Todd is a 6 year old male with medulloblastoma currently enrolled on Headstart IV feeling well now having completed Cycle 1 chemotherapy. He is s/p stem cell harvest on Aug 25, 2020.He began on cycle 2 chemotherapy on Aug 27.  He received MTX 11,500mg IV over 4 hours on day 4 (8/30/2020), followed by post-chemotherapy hydration. He continues to meet post MTX infusion UOP goal of >95mL/Hr.    His 72 MTX level was MTX= 0.3; Goal <0.1. Will continue with Q24H MTX levels until goal of <0.1 is reached. MTX level at 24 hour= 9.72; goal <10. MTX Level at 48 hrs= .75; goal <1. MTX levels will be drawn every 24 hours until MTX level reaches goal of <0.1. Once MTX clear- start Neupogen     He is complaining of continued abdominal pain in the epigastric region with moderate relief with IV Morphine Q3H.  Since 8/31/2020 he had no stools. Is due for a dose on vincristine today (9/3/2020). He received one dose of prn Miralax and prn lactose yesterday without relief. Given one PO dose of naloxone and Miralax changed to BID.     Mucositis is worsening following infusion of high dose MTX as evident by more pronounced scalloping on the posterior buccal mucosa and increased oral secretions NG tube in place, was previously receiving continuos NGT feeds at 65mL/Hr, however a couple episodes of vomiting. NGT paused until 1800 on 9/3/2020. Will restart at 30mL/hr and slowly increase by 10mL Q6H until goal of 65m;/hr. He continues with severe malnutrition as evidenced by inability to tolerate diet and requires ongoing tube feeds to provide adequate nutrition & energy needs.      Received IV pentamidine Aug 26, next due Sep 9.

## 2024-09-13 NOTE — PROGRESS NOTES
Assessment & Plan     1. Essential hypertension  - BP is outside of goal of being <130/80. Discussed with Zurdo recommend starting BP medication and he agrees with this plan. Will plan for him to take Losartan 50 mg daily for the next month and check his BP daily and keep a log of his values and follow up in a month for a recheck. Side effects of medication reviewed. Last CMP checked on 04/04/24 and was WNL. Encouraged Zurdo to continue focusing on lifestyle changes. Return to clinic and ER precautions discussed.   - losartan (COZAAR) 50 MG tablet; Take 1 tablet (50 mg) by mouth daily.  Dispense: 30 tablet; Refill: 0    2. Gastroesophageal reflux disease, unspecified whether esophagitis present  - Well controlled, discussed can use Omperazole 20 mg PRN, follow a GERD friendly diet, limit spicy foods, etc.     Follow up in 1 month for BP recheck. Reasons to follow-up sooner or seek emergent care reviewed.     Brenda Goss PA-C  [facility]       Subjective     Zurdo Navas is a 53 year old male who presents to clinic today for the following health issues:    HPI   Chief Complaint   Patient presents with    Hypertension     Follow up for BP after visit last month, has been consistently high while monitoring at home.       Zurdo presents for a follow up on his blood pressure. He was last seen on 08/09/24 for concerns of high blood pressure. Plan at the time was for Zurdo to check his BP at home which he has been doing and his BP log here today. Notes his BP has still been quite high, range has been between 136-161 / . He denies any symptoms of chest pain, SOB, palpitations, or swelling in his legs. He has been trying to follow a low salt diet. Notes he has not been very active recently, is on his feet at work but does not exercise outside of this. He has been under more stress than normal as his mother passed away 3 months ago and and there have been more stress with work. He denies any SI/HI.  There is a significant family history of HTN.     GERD: Was prescribed Omeprazole 20 mg to take for acid reflux at last office visit on 08/09/24 which Zurdo notes helped him for about 8-9 days and then his symptoms resolved so he stopped taking the medication afterwards. He denies any side effects or any concerns with reflux currently.     Daily medications reviewed.       Objective    BP (!) 158/98 (BP Location: Right arm, Patient Position: Sitting, Cuff Size: Adult Large)   Pulse 76   Temp 97.5  F (36.4  C) (Temporal)   Wt 96 kg (211 lb 9.6 oz)   SpO2 97%   BMI 29.93 kg/m    Body mass index is 29.93 kg/m .    Physical Examination:  GENERAL: healthy, alert and no distress  EYES: Eyes grossly normal to inspection, PERRL and conjunctivae and sclerae normal  HENT: mouth without ulcers or lesions  NECK: no adenopathy, no asymmetry, masses, or scars and thyroid normal to palpation  RESP: lungs clear to auscultation - no rales, rhonchi or wheezes  CV: regular rate and rhythm, normal S1 S2, no S3 or S4, no murmur, click or rub, no peripheral edema   ABDOMEN: soft and non-tender  MS: no gross musculoskeletal defects noted, no edema  SKIN: no suspicious lesions or rashes  PSYCH: mentation appears normal, affect normal/bright

## 2024-09-13 NOTE — PATIENT INSTRUCTIONS
Thanks for coming in today Zurdo.     Blood pressure continues to be high and above goal of being <130/80. Recommend you start taking Losartan 50 mg once a day. Please continue to check your blood pressure daily and write down your numbers. Please let me know if you develop any side effects such as dizziness. Please schedule a follow up appointment in one month to recheck your blood pressure.     Continue to focus on weight loss, following a low salt diet, and regular exercise.     Can continue to use Omeprazole as needed for reflux.

## 2024-09-17 ENCOUNTER — OFFICE VISIT (OUTPATIENT)
Dept: FAMILY MEDICINE | Facility: CLINIC | Age: 54
End: 2024-09-17

## 2024-09-17 VITALS
DIASTOLIC BLOOD PRESSURE: 98 MMHG | BODY MASS INDEX: 29.56 KG/M2 | WEIGHT: 209 LBS | OXYGEN SATURATION: 95 % | RESPIRATION RATE: 18 BRPM | HEART RATE: 107 BPM | SYSTOLIC BLOOD PRESSURE: 140 MMHG

## 2024-09-17 DIAGNOSIS — G56.02 CARPAL TUNNEL SYNDROME OF LEFT WRIST: ICD-10-CM

## 2024-09-17 DIAGNOSIS — M25.522 LEFT ELBOW PAIN: Primary | ICD-10-CM

## 2024-09-17 DIAGNOSIS — M77.12 LATERAL EPICONDYLITIS OF LEFT ELBOW: ICD-10-CM

## 2024-09-17 PROCEDURE — G2211 COMPLEX E/M VISIT ADD ON: HCPCS | Performed by: STUDENT IN AN ORGANIZED HEALTH CARE EDUCATION/TRAINING PROGRAM

## 2024-09-17 PROCEDURE — 73070 X-RAY EXAM OF ELBOW: CPT | Mod: LT | Performed by: STUDENT IN AN ORGANIZED HEALTH CARE EDUCATION/TRAINING PROGRAM

## 2024-09-17 PROCEDURE — 99214 OFFICE O/P EST MOD 30 MIN: CPT | Performed by: STUDENT IN AN ORGANIZED HEALTH CARE EDUCATION/TRAINING PROGRAM

## 2024-09-17 RX ORDER — MELOXICAM 15 MG/1
15 TABLET ORAL DAILY PRN
Qty: 30 TABLET | Refills: 0 | Status: SHIPPED | OUTPATIENT
Start: 2024-09-17

## 2024-09-17 NOTE — PROGRESS NOTES
ASSESSMENT & PLAN      ICD-10-CM    1. Left elbow pain  M25.522 XR Elbow Left 2 Views      2. Lateral epicondylitis of left elbow  M77.12 meloxicam (MOBIC) 15 MG tablet      3. Carpal tunnel syndrome of left wrist  G56.02          Lateral elbow pain  XRs obtained and reviewed with patient.   Hx and exam most c/w lateral epicondylitis.   Reviewed options for treatment and/or further eval, agreed on plan below    Also reviewed chronic L CTS, nature and tx options, patient to consider and follow-up as below    Patient Instructions   Meloxicam once daily as needed for inflammation/pain    Recommend elbow therapy - let me know if you'd like referral    Can also do elbow injection if needed     Carpal tunnel options: brace, therapy, injection, surgery    Follow-up in 1 month      35 minutes spent on the date of the encounter doing chart review, history and exam, documentation and further activities per the note.    Carlos Cagle MD, [facility]      -----    SUBJECTIVE  Zurdo Navas is a/an 53 year old male who is seen for evaluation of     Chief Complaint   Patient presents with    Consult For     Left elbow pain that started a month ago, does weight lifting every morning and feels he may have strained it, does have some upper back pain that goes into the elbow, wants to do xray of upper back and elbow to see if they are correlated      The patient is seen by themselves.  The patient is Right handed    Date of Onset: 1 month  Mechanism of injury: Reports insidious onset without acute precipitating event. Possibly related to his weight lifting routine  Location of Pain: left lateral elbow primary area, occasional intermittent pain radiating up to posterior shoulder  Worsened by: driving  Better with: bengay, activity modification, counterforce strap  Associated symptoms: no assc numbness/weakness, occasional L sided carpal tunnel sx since 2020  Social History/Occupation: drives  forklift    Patient's PMH, PSH, and family hx reviewed.      OBJECTIVE:  BP (!) 140/98 (BP Location: Left arm, Patient Position: Sitting, Cuff Size: Adult Large)   Pulse 107   Resp 18   Wt 94.8 kg (209 lb)   SpO2 95%   BMI 29.56 kg/m     Alert, NAD  NC/AT  Sclerae anicteric  Regular  Resp nonlabored  Skin warm and dry  No focal neuro deficits. Speech intact. Normal gait.  Appropriate affect  L elbow no deformity, full ROM, TTP at CET, pain with resisted ext and pron  L hand +Tinel at carpal tunnel, no atrophy, str intact    RADIOLOGY:  Results for orders placed or performed in visit on 09/17/24   XR Elbow Left 2 Views     Status: None    Narrative    Radiologist Consultation/:   Fax:  [phone]  [phone]  _____________________________________________________________________________________________________________________________________________________________________________________________________________________________________________________________________________________________________________________________________________________________________________________________________________________________________________________________________________________________________  PATIENT NAME: CHARRAN, MAGED L  YOB: 1970 Age: 53 ACCESSION NUMBER: [id number]  SEX: M ORDERING PROVIDER: Carlos Gurjit  FACILITY: [facility] PRIMARY PROVIDER:  PATIENT ID: [id number] INTERESTED PARTY:  Page 1 of 1  _________________________________________________________________________________________________________________________________________________________________________________________________________________________________________________________________________________________________________________________________________________________________________________________  EXAM: XRAY ELBOW,AP-LAT LEFT  LOCATION: [facility]  DATE:  9/17/2024  INDICATION: Left elbow pain  COMPARISON: None.  IMPRESSION: Normal joint spaces and alignment. No fracture or joint effusion. Trace triceps enthesophyte.  SIGNED BY: Kali Plasencia MD 9/18/2024 11:49 AM

## 2024-09-17 NOTE — PATIENT INSTRUCTIONS
Meloxicam once daily as needed for inflammation/pain    Recommend elbow therapy - let me know if you'd like referral    Can also do elbow injection if needed     Carpal tunnel options: brace, therapy, injection, surgery    Follow-up in 1 month

## 2024-09-17 NOTE — NURSING NOTE
Chief Complaint   Patient presents with    Consult For     Left elbow pain that started a month ago, does weight lifting every morning and feels he may have strained it, does have some upper back pain that goes into the elbow, wants to do xray of upper back and elbow to see if they are correlated      Pre-visit Screening:  Immunizations:  up to date  Colonoscopy:  is up to date  Mammogram: na  Asthma Action Test/Plan:  na  PHQ9:  na  GAD7:  na  Questioned patient about current smoking habits Pt. quit smoking some time ago.  Ok to leave detailed message on voice mail for today's visit only yes, phone # [phone] (home)

## 2024-09-23 ENCOUNTER — OFFICE VISIT (OUTPATIENT)
Dept: FAMILY MEDICINE | Facility: CLINIC | Age: 54
End: 2024-09-23

## 2024-09-23 VITALS
DIASTOLIC BLOOD PRESSURE: 88 MMHG | OXYGEN SATURATION: 97 % | TEMPERATURE: 97.7 F | BODY MASS INDEX: 29.96 KG/M2 | SYSTOLIC BLOOD PRESSURE: 136 MMHG | WEIGHT: 211.8 LBS | HEART RATE: 76 BPM

## 2024-09-23 DIAGNOSIS — M25.522 LEFT ELBOW PAIN: ICD-10-CM

## 2024-09-23 DIAGNOSIS — M79.2 RADICULAR PAIN IN LEFT ARM: Primary | ICD-10-CM

## 2024-09-23 DIAGNOSIS — M77.12 LATERAL EPICONDYLITIS OF LEFT ELBOW: ICD-10-CM

## 2024-09-23 PROCEDURE — 99213 OFFICE O/P EST LOW 20 MIN: CPT | Performed by: STUDENT IN AN ORGANIZED HEALTH CARE EDUCATION/TRAINING PROGRAM

## 2024-09-23 PROCEDURE — G2211 COMPLEX E/M VISIT ADD ON: HCPCS | Performed by: STUDENT IN AN ORGANIZED HEALTH CARE EDUCATION/TRAINING PROGRAM

## 2024-09-23 RX ORDER — METHYLPREDNISOLONE 4 MG
TABLET, DOSE PACK ORAL
Qty: 21 TABLET | Refills: 0 | Status: SHIPPED | OUTPATIENT
Start: 2024-09-23

## 2024-09-23 NOTE — PATIENT INSTRUCTIONS
Start medrol pack -- Stop meloxicam while taking     PT at [facility]   [address]  [address]  [facility] [id number] [phone] - appt line    Follow-up with me in one month, sooner if worsening

## 2024-09-23 NOTE — NURSING NOTE
Chief Complaint   Patient presents with    Follow Up     Follow up for L elbow pain. Pt developed pain that rushes through shoulder to elbow and numbness in L fingers.      Pre-visit Screening:  Immunizations:  up to date  Colonoscopy:  is up to date  Mammogram: na  Asthma Action Test/Plan:  na  PHQ9:  na  GAD7:  na  Questioned patient about current smoking habits Pt. quit smoking some time ago.  Ok to leave detailed message on voice mail for today's visit only yes, phone # [phone] (home)

## 2024-09-23 NOTE — PROGRESS NOTES
ASSESSMENT & PLAN      ICD-10-CM    1. Radicular pain in left arm  M79.2 Physical Therapy  Referral - To a Non [facility] Location (Use POS/Location)     methylPREDNISolone (MEDROL DOSEPAK) 4 MG tablet therapy pack      2. Left elbow pain  M25.522 Physical Therapy  Referral - To a Non [facility] Location (Use POS/Location)      3. Lateral epicondylitis of left elbow  M77.12 Physical Therapy  Referral - To a Non [facility] Location (Use POS/Location)         Increasing radicular component, cervical vs cubital tunnel. Also with CTS and lateral epicondylitis sx, suspect multifactorial/overlapping.    Strongly encouraged PT, patient resistant but will consider  R/b medrol reviewed, can restart mobiq after completed    Patient Instructions   Start medrol pack -- Stop meloxicam while taking     PT at [facility]   [address]  [address]  [facility] [address]  [phone] - appt line    Follow-up with me in one month, sooner if worsening     Carlos Cagle MD, [facility]  Primary Care Sports Medicine   -----    SUBJECTIVE:  Zurdo Navas is a 53 year old male who is seen in follow-up for   Nursing Notes:   Henna Olson MA  9/23/2024 12:16 PM  Signed  Chief Complaint   Patient presents with    Follow Up     Follow up for L elbow pain. Pt developed pain that rushes through shoulder to elbow and numbness in L fingers.      Pre-visit Screening:  Immunizations:  up to date  Colonoscopy:  is up to date  Mammogram: na  Asthma Action Test/Plan:  na  PHQ9:  na  GAD7:  na  Questioned patient about current smoking habits Pt. quit smoking some time ago.  Ok to leave detailed message on voice mail for today's visit only yes, phone # [phone] (home)          They were last seen 9/17/2024.   Sometimes feels scapular pain radiate down shoulder and upper arm, tingling ulnar aspect of hand. No focal weakness.   Since their last visit reports worsening pain.   No acute  injury  Working driving over the weekend  Meloxicam not helpful after a few days, no side effects    The patient is seen with their wife.    Patient's past medical, surgical, social, and family histories were reviewed today and no changes are noted.      OBJECTIVE:  /88 (BP Location: Left arm, Patient Position: Sitting, Cuff Size: Adult Large)   Pulse 76   Temp 97.7  F (36.5  C) (Temporal)   Wt 96.1 kg (211 lb 12.8 oz)   SpO2 97%   BMI 29.96 kg/m     Alert, NAD  NC/AT  Sclerae anicteric  Regular  Resp nonlabored  Skin warm and dry  No focal neuro deficits. Speech intact. Normal gait.  Appropriate affect  Negative spurling  Str and sens intact on exam throughout LUE  No atrophy  +Tinel at cubital tunnel

## 2024-10-03 ENCOUNTER — OFFICE VISIT (OUTPATIENT)
Dept: FAMILY MEDICINE | Facility: CLINIC | Age: 54
End: 2024-10-03

## 2024-10-03 VITALS
HEART RATE: 81 BPM | OXYGEN SATURATION: 97 % | TEMPERATURE: 97.6 F | BODY MASS INDEX: 29.73 KG/M2 | DIASTOLIC BLOOD PRESSURE: 90 MMHG | WEIGHT: 210.2 LBS | SYSTOLIC BLOOD PRESSURE: 140 MMHG

## 2024-10-03 DIAGNOSIS — M79.2 RADICULAR PAIN IN LEFT ARM: Primary | ICD-10-CM

## 2024-10-03 PROCEDURE — 99213 OFFICE O/P EST LOW 20 MIN: CPT | Performed by: FAMILY MEDICINE

## 2024-10-03 NOTE — NURSING NOTE
Chief Complaint   Patient presents with    Consult     Pain in L shoulder. Saw Dr. Cagle last week and pain got worse. Started PT at work on Monday. Feels pain in back of shoulder, elbow, wrist and ends of fingers feels like needles poking out. Has been using ibuprofen. When he extends his arm he feels pain rushing down.      Pre-visit Screening:  Immunizations:  Not applicable  Colonoscopy:  is up to date  Mammogram: an  Asthma Action Test/Plan:  na  PHQ9:  na  GAD7:  na  Questioned patient about current smoking habits Pt. quit smoking some time ago.  Ok to leave detailed message on voice mail for today's visit only yes, phone # [phone] (home)

## 2024-10-03 NOTE — PROGRESS NOTES
SUBJECTIVE:  Zurdo Navas, a 53 year old male scheduled an appointment to discuss the following issues:  Radicular pain in left arm-follow up from 2 visits with Dr Cagle    Pain is worsening    Pain now with constant  left upper back radiating through entire left arm-some numbness in digits 3-5    Pt using ibuprofen 400 mg  -helps take edge off only but now barely able to get through part of work day      He was  last seen 2024 by Dr Cagle- dx left epicondylitis , CTS, possible cervical radiculopathy -nsaids and medrol dose pack tried-referred for PT- . Did sessions with work therapist- used massage ball- no better at all     feels scapular pain radiate down shoulder and upper arm, tingling ulnar aspect of hand. No focal weakness.   Since their last visit reports worsening pain.   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    Patient Active Problem List   Diagnosis    ACP (advance care planning)    Overweight    Essential hypertension    Gastroesophageal reflux disease, unspecified whether esophagitis present     No past medical history on file.    Family History   Problem Relation Age of Onset    Hypertension Mother     Diabetes Mother     Cerebrovascular Disease Mother     Alzheimer Disease Mother         Passed away at 92    Hypertension Father          age 74    Diabetes Sister     Breast Cancer Sister     Coronary Artery Disease Brother     Myocardial Infarction Brother 55    Hypertension Brother     Other - See Comments Daughter         cervix hx    Colon Cancer No family hx of     Prostate Cancer No family hx of        Social History     Socioeconomic History    Marital status:      Spouse name: Rogerla    Number of children: 1    Years of education: 12    Highest education level: Not on file   Occupational History    Occupation: [employer]- [employer]   Tobacco  Use    Smoking status: Former     Current packs/day: 0.00     Average packs/day: 0.5 packs/day for 1 year (0.5 ttl pk-yrs)     Types: Cigarettes     Start date: 1/10/2005     Quit date: 1/10/2006     Years since quittin.7     Passive exposure: Past    Smokeless tobacco: Never    Tobacco comments:     1/2 pack a day for 10 years   Vaping Use    Vaping status: Never Used   Substance and Sexual Activity    Alcohol use: Yes     Alcohol/week: 1.0 standard drink of alcohol     Types: 1 Standard drinks or equivalent per week     Comment: rare    Drug use: No    Sexual activity: Yes     Partners: Female     Birth control/protection: Condom   Other Topics Concern     Service No    Blood Transfusions No    Caffeine Concern No    Occupational Exposure No    Hobby Hazards No    Sleep Concern No    Stress Concern No    Weight Concern No    Special Diet No    Back Care No    Exercise No    Bike Helmet Not Asked    Seat Belt Yes    Self-Exams Not Asked   Social History Narrative    needs primary tetanus diphtheria series, first shot oct 29 2003     Social Determinants of Health     Financial Resource Strain: Not on file   Food Insecurity: Not on file   Transportation Needs: Not on file   Physical Activity: Not on file   Stress: Not on file   Social Connections: Not on file   Interpersonal Safety: Not on file   Housing Stability: Not on file       Past Surgical History:   Procedure Laterality Date    NO HISTORY OF SURGERY         Current Outpatient Medications   Medication Sig Dispense Refill    losartan (COZAAR) 50 MG tablet Take 1 tablet (50 mg) by mouth daily. 30 tablet 0    meloxicam (MOBIC) 15 MG tablet Take 1 tablet (15 mg) by mouth daily as needed for moderate pain. 30 tablet 0    methylPREDNISolone (MEDROL DOSEPAK) 4 MG tablet therapy pack Follow Package Directions 21 tablet 0    Multiple Vitamins-Minerals (ONE-A-DAY MENS 50+ ADVANTAGE) TABS       omeprazole (PRILOSEC) 20 MG DR capsule Take 1 capsule (20 mg) by  mouth daily in the morning one hour before breakfast 30 capsule 0    triamcinolone (KENALOG) 0.1 % external ointment Apply topically 2 times daily 30 g 1     No current facility-administered medications for this visit.        Allergies: No known allergies      Immunization History   Administered Date(s) Administered    COVID-19 MONOVALENT 12+ (Pfizer) 04/01/2021, 04/20/2021, 12/14/2021    TD,PF 7+ (Tenivac) 10/29/2003, 01/26/2004    TDAP Vaccine (Boostrix) 01/14/2015        ROS:  CONSTITUTIONAL: NEGATIVE for fever, chills  EYES: NEGATIVE for vision changes   RESP: NEGATIVE for significant cough or SOB  CV: NEGATIVE for chest pain, palpitations   GI: NEGATIVE for nausea, abdominal pain, heartburn, or change in bowel habits  : NEGATIVE for frequency, dysuria, or hematuria    OBJECTIVE:  BP (!) 140/90 (BP Location: Right arm, Patient Position: Sitting, Cuff Size: Adult Large)   Pulse 81   Temp 97.6  F (36.4  C) (Temporal)   Wt 95.3 kg (210 lb 3.2 oz)   SpO2 97%   BMI 29.73 kg/m    EXAM:  GENERAL APPEARANCE: healthy, alert and no distress  MS: pt holding left arm in flexion at elbow, appears uncomfortable, tender left scapular , poor ROM shoulder due to pain as well as elbow, tender lateral epicondyle, pos TInels sign    ASSESSMENT/PLAN:  (M79.2) Radicular pain in left arm  (primary encounter diagnosis)  Comment: pt with evidence cervical radiculopathy, latral epicondylitis, carpal tunnel syndrome -multifactorial issues- worseningn despite nsaids, steroids, PT-pt appears very uncomfortable  Plan: recommended he present now to [facility] urgent care -likely needs imaging ortho cares

## 2024-10-07 ENCOUNTER — TRANSFERRED RECORDS (OUTPATIENT)
Dept: FAMILY MEDICINE | Facility: CLINIC | Age: 54
End: 2024-10-07

## 2024-10-14 DIAGNOSIS — M77.12 LATERAL EPICONDYLITIS OF LEFT ELBOW: ICD-10-CM

## 2024-10-15 NOTE — TELEPHONE ENCOUNTER
Please advise, pt last seen 09/23/24 your note says meloxicam wasn't working well. Was he going to continue this?    Zurdo Navas is requesting a refill of:    Pending Prescriptions:                       Disp   Refills    meloxicam (MOBIC) 15 MG tablet [Pharmacy *30 tab*0            Sig: TAKE 1 TABLET (15 MG) BY MOUTH DAILY AS NEEDED           FOR MODERATE PAIN

## 2024-10-16 RX ORDER — MELOXICAM 15 MG/1
15 TABLET ORAL DAILY PRN
COMMUNITY
Start: 2024-10-16

## 2024-10-16 NOTE — TELEPHONE ENCOUNTER
Zurdo Navas is requesting a refill of:    Refused Prescriptions:                       Disp   Refills    meloxicam (MOBIC) 15 MG tablet [Pharmacy M*                Sig: TAKE 1 TABLET (15 MG) BY MOUTH DAILY AS NEEDED FOR           MODERATE PAIN  Refused By: JULIA BRUNSON  Reason for Refusal: Medication has been discontinued    Verified with patient, he is no longer taking this medication.

## 2024-10-17 ENCOUNTER — TRANSFERRED RECORDS (OUTPATIENT)
Dept: FAMILY MEDICINE | Facility: CLINIC | Age: 54
End: 2024-10-17

## 2024-10-22 ENCOUNTER — TRANSFERRED RECORDS (OUTPATIENT)
Dept: FAMILY MEDICINE | Facility: CLINIC | Age: 54
End: 2024-10-22

## 2024-11-01 ENCOUNTER — TRANSFERRED RECORDS (OUTPATIENT)
Dept: FAMILY MEDICINE | Facility: CLINIC | Age: 54
End: 2024-11-01

## 2024-11-15 ENCOUNTER — TRANSFERRED RECORDS (OUTPATIENT)
Dept: FAMILY MEDICINE | Facility: CLINIC | Age: 54
End: 2024-11-15

## 2025-04-12 DIAGNOSIS — I10 ESSENTIAL HYPERTENSION: ICD-10-CM

## 2025-04-14 RX ORDER — LOSARTAN POTASSIUM 50 MG/1
50 TABLET ORAL DAILY
COMMUNITY
Start: 2025-04-14

## 2025-04-14 NOTE — TELEPHONE ENCOUNTER
Refused Prescriptions:                       Disp   Refills    losartan (COZAAR) 50 MG tablet [Pharmacy M*                Sig: TAKE 1 TABLET BY MOUTH EVERY DAY  Refused By: ENEDELIA CID  Reason for Refusal: Patient needs appointment  Reason for Refusal Comment: pt is due for a fasting med check     Per notes on 3-   (Follow up in 1 month for blood pressure recheck/fasting physical )    Enedelia

## 2025-05-02 PROBLEM — L20.89 OTHER ATOPIC DERMATITIS: Status: ACTIVE | Noted: 2025-05-02

## 2025-05-24 ENCOUNTER — HEALTH MAINTENANCE LETTER (OUTPATIENT)
Age: 55
End: 2025-05-24

## 2025-05-24 DIAGNOSIS — I10 ESSENTIAL HYPERTENSION: ICD-10-CM

## 2025-05-28 RX ORDER — LOSARTAN POTASSIUM 100 MG/1
100 TABLET ORAL DAILY
COMMUNITY
Start: 2025-05-28

## 2025-05-28 NOTE — TELEPHONE ENCOUNTER
Zurdo Navas is requesting a refill of:    Refused Prescriptions:                       Disp   Refills    losartan (COZAAR) 100 MG tablet [Pharmacy *                Sig: TAKE 1 TABLET BY MOUTH EVERY DAY  Refused By: DIANE GONZALEZ  Reason for Refusal: Patient needs appointment    Has OV 5/30/25

## 2025-05-30 ENCOUNTER — OFFICE VISIT (OUTPATIENT)
Dept: FAMILY MEDICINE | Facility: CLINIC | Age: 55
End: 2025-05-30

## 2025-05-30 VITALS
BODY MASS INDEX: 29.79 KG/M2 | HEIGHT: 71 IN | WEIGHT: 212.8 LBS | OXYGEN SATURATION: 95 % | HEART RATE: 78 BPM | TEMPERATURE: 97.3 F | SYSTOLIC BLOOD PRESSURE: 130 MMHG | DIASTOLIC BLOOD PRESSURE: 92 MMHG

## 2025-05-30 DIAGNOSIS — Z12.5 SCREENING FOR PROSTATE CANCER: ICD-10-CM

## 2025-05-30 DIAGNOSIS — Z23 ENCOUNTER FOR IMMUNIZATION: ICD-10-CM

## 2025-05-30 DIAGNOSIS — I10 ESSENTIAL HYPERTENSION: ICD-10-CM

## 2025-05-30 DIAGNOSIS — E78.2 MIXED HYPERLIPIDEMIA: ICD-10-CM

## 2025-05-30 DIAGNOSIS — Z00.00 ROUTINE GENERAL MEDICAL EXAMINATION AT A HEALTH CARE FACILITY: Primary | ICD-10-CM

## 2025-05-30 DIAGNOSIS — R73.03 PREDIABETES: ICD-10-CM

## 2025-05-30 LAB
ALBUMIN SERPL-MCNC: 4.3 G/DL (ref 3.6–5.1)
ALP SERPL-CCNC: 50 U/L (ref 33–130)
ALT 1742-6: 24 U/L (ref 0–32)
AST 1920-8: 21 U/L (ref 0–35)
BILIRUB SERPL-MCNC: 0.8 MG/DL (ref 0.2–1.2)
BUN SERPL-MCNC: 10 MG/DL (ref 7–25)
BUN/CREATININE RATIO: 11 (ref 6–32)
CALCIUM SERPL-MCNC: 9.1 MG/DL (ref 8.6–10.3)
CHLORIDE SERPLBLD-SCNC: 102.6 MMOL/L (ref 98–110)
CHOLEST SERPL-MCNC: 187 MG/DL (ref 0–199)
CHOLEST/HDLC SERPL: 4 {RATIO} (ref 0–5)
CO2 SERPL-SCNC: 28.2 MMOL/L (ref 20–32)
CREAT SERPL-MCNC: 0.91 MG/DL (ref 0.6–1.3)
GLUCOSE SERPL-MCNC: 97 MG/DL (ref 60–99)
HDLC SERPL-MCNC: 48 MG/DL (ref 40–150)
HEMOGLOBIN A1C: 6.1 % (ref 4–5.6)
LDLC SERPL CALC-MCNC: 116 MG/DL (ref 0–129)
POTASSIUM SERPL-SCNC: 4.24 MMOL/L (ref 3.5–5.3)
PROT SERPL-MCNC: 6.7 G/DL (ref 6.1–8.1)
SODIUM SERPL-SCNC: 139.9 MMOL/L (ref 135–146)
TRIGL SERPL-MCNC: 115 MG/DL (ref 0–149)

## 2025-05-30 PROCEDURE — 90471 IMMUNIZATION ADMIN: CPT

## 2025-05-30 PROCEDURE — 99396 PREV VISIT EST AGE 40-64: CPT

## 2025-05-30 PROCEDURE — 36415 COLL VENOUS BLD VENIPUNCTURE: CPT

## 2025-05-30 PROCEDURE — 99214 OFFICE O/P EST MOD 30 MIN: CPT | Mod: 25

## 2025-05-30 PROCEDURE — 80053 COMPREHEN METABOLIC PANEL: CPT

## 2025-05-30 PROCEDURE — 83036 HEMOGLOBIN GLYCOSYLATED A1C: CPT

## 2025-05-30 PROCEDURE — G2211 COMPLEX E/M VISIT ADD ON: HCPCS

## 2025-05-30 PROCEDURE — 84153 ASSAY OF PSA TOTAL: CPT | Mod: 90

## 2025-05-30 PROCEDURE — 80061 LIPID PANEL: CPT

## 2025-05-30 PROCEDURE — 90715 TDAP VACCINE 7 YRS/> IM: CPT

## 2025-05-30 RX ORDER — LOSARTAN POTASSIUM 100 MG/1
100 TABLET ORAL DAILY
Qty: 30 TABLET | Refills: 0 | Status: CANCELLED | OUTPATIENT
Start: 2025-05-30

## 2025-05-30 RX ORDER — LOSARTAN POTASSIUM AND HYDROCHLOROTHIAZIDE 12.5; 1 MG/1; MG/1
1 TABLET ORAL DAILY
Qty: 30 TABLET | Refills: 0 | Status: SHIPPED | OUTPATIENT
Start: 2025-05-30

## 2025-05-30 NOTE — PROGRESS NOTES
Preventive Care Visit  [facility]  Brenda Goss PA-C, Family Medicine  May 30, 2025      SUBJECTIVE:   Zurdo is a 54 year old, presenting for the following:  Physical (CPX fasting and med refill )      HPI    Zurdo presents for his yearly physical.    Daily medications: Reviewed.     -HTN: Last seen on 05/02/25 where dose of Losartan was increased from 50 to 100 mg daily. Notes he has been checking his BP almost daily and range has been in the 120-155 / , with most values in the 130-140's/90's range. Denies any side effects of medication. No chest pain, SOB, palpitations, or any swelling in legs.     Concerns: None.     Past medical history and daily medications reviewed. Reviewed past medical history, surgical history, family history, and social history below.     Social history:  -Diet: Tries to eat healthy overall, primarily chicken for protein, lots of fruits and vegetables, milk for calcium.   -Water intake: Drinks a good amount of water, 1 cup of coffee, cranberry juice, occasional red bull.   -Exercise: Not very active, active with his job, will start going on regular walks outside.   -Sleep: Difficult as he works at night, does snore per wife, not interested in doing a sleep study right now.   -Daily vitamins: Men's multivitamin.   -Dentist: Every 3 months.   -Eye doctor: Every 2 years.   -Smoking: Former.   -Alcohol: Rare.      Screenings:  -Immunizations: Due for DTAP. Otherwise up to date, declines pneumonia.   -Lab work: CMP, lipid, PSA, A1C.  -Colonoscopy: Up to date on cologuard, due 08/2027.     Have you ever done Advance Care Planning? (For example, a Health Directive, POLST, or a discussion with a medical provider or your loved ones about your wishes): No, advance care planning information given to patient to review.  Patient declined advance care planning discussion at this time.    Social History     Tobacco Use    Smoking status: Former     Current packs/day: 0.00      Average packs/day: 0.5 packs/day for 1 year (0.5 ttl pk-yrs)     Types: Cigarettes     Start date: 1/10/2005     Quit date: 1/10/2006     Years since quittin.3     Passive exposure: Past    Smokeless tobacco: Never    Tobacco comments:     1/2 pack a day for 10 years   Substance Use Topics    Alcohol use: Yes     Alcohol/week: 1.0 standard drink of alcohol     Types: 1 Standard drinks or equivalent per week     Comment: rare     Last PSA:   [facility] PSA   Date Value Ref Range Status   2024 2.46 < OR = 4.00 ng/mL Final     Comment:     The total PSA value from this assay system is   standardized against the WHO standard. The test   result will be approximately 20% lower when compared   to the equimolar-standardized total PSA ([place]   [place]). Comparison of serial PSA results should be   interpreted with this fact in mind.     This test was performed using the Siemens   chemiluminescent method. Values obtained from   different assay methods cannot be used  interchangeably. PSA levels, regardless of  value, should not be interpreted as absolute  evidence of the presence or absence of disease.       Reviewed orders with patient. Reviewed health maintenance and updated orders accordingly - Yes    Lab work is in process.     Reviewed and updated as needed this visit by clinical staff   Tobacco  Allergies  Meds   Med Hx  Surg Hx  Fam Hx  Soc Hx      Reviewed and updated as needed this visit by Provider   Tobacco  Allergies  Meds   Med Hx  Surg Hx  Fam Hx  Soc Hx Sexual   Activity        Review of Systems  CONSTITUTIONAL: NEGATIVE for fever, chills, change in weight  INTEGUMENTARY/SKIN: NEGATIVE for worrisome rashes, moles or lesions  EYES: NEGATIVE for vision changes or irritation  ENT/MOUTH: NEGATIVE for ear, mouth and throat problems  RESP: NEGATIVE for significant cough or SOB  CV: NEGATIVE for chest pain, palpitations or peripheral edema  GI: NEGATIVE for nausea, abdominal pain, heartburn,  "or change in bowel habits  : NEGATIVE for frequency, dysuria, or hematuria  MUSCULOSKELETAL: NEGATIVE for significant arthralgias or myalgia  NEURO: NEGATIVE for weakness, dizziness or paresthesias  ENDOCRINE: NEGATIVE for temperature intolerance, skin/hair changes  HEME: NEGATIVE for bleeding problems  PSYCHIATRIC: NEGATIVE for changes in mood or affect    OBJECTIVE:   BP (!) 130/92 (BP Location: Left arm, Patient Position: Sitting, Cuff Size: Adult Large)   Pulse 78   Temp 97.3  F (36.3  C) (Temporal)   Ht 1.803 m (5' 11\")   Wt 96.5 kg (212 lb 12.8 oz)   SpO2 95%   BMI 29.68 kg/m     Estimated body mass index is 29.68 kg/m  as calculated from the following:    Height as of this encounter: 1.803 m (5' 11\").    Weight as of this encounter: 96.5 kg (212 lb 12.8 oz).    Physical Exam  GENERAL: alert and no distress  EYES: Eyes grossly normal to inspection, PERRL and conjunctivae and sclerae normal  HENT: ear canals and TM's normal, nose and mouth without ulcers or lesions  NECK: no adenopathy, no asymmetry, masses, or scars  RESP: lungs clear to auscultation - no rales, rhonchi or wheezes  CV: regular rate and rhythm, normal S1 S2, no S3 or S4, no murmur, click or rub, no peripheral edema  ABDOMEN: soft, nontender, no hepatosplenomegaly, no masses and bowel sounds normal   (male): declines, no concerns  MS: no gross musculoskeletal defects noted, no edema  SKIN: no suspicious lesions or rashes  NEURO: Normal strength and tone, mentation intact and speech normal  PSYCH: mentation appears normal, affect normal/bright    Lab work pending.     ASSESSMENT/PLAN:     Routine general medical examination at a health care facility  - Zurdo is overall doing well today. Encouraged healthy eating habits, daily exercise, reviewed screenings/immunizations, etc.     Essential hypertension  - Not quite at goal of being <130/80, will add in hydrochlorothiazide 12.5 mg along with Losartan 100 mg to help lower BP to <130/80. " Side effects of medication reviewed. He will follow up in one month for a recheck.   - VENOUS COLLECTION  - Comprehensive Metobolic Panel ([facility])  - losartan-hydrochlorothiazide (HYZAAR) 100-12.5 MG tablet; Take 1 tablet by mouth daily.    Mixed hyperlipidemia  - Labs pending, patient will be notified of results.   - VENOUS COLLECTION  - Lipid Panel ([facility])    Prediabetes  - Labs pending, patient will be notified of results.   - VENOUS COLLECTION  - Comprehensive Metobolic Panel ([facility])  - HEMOGLOBIN A1C ([facility])    Screening for prostate cancer  - Labs pending, patient will be notified of results.   - VENOUS COLLECTION  - PSA Total ([facility])    Encounter for immunization  - TDAP VACCINE (Adacel, Boostrix)  [[id number]]    Patient has been advised of split billing requirements and indicates understanding: Yes    Counseling  Reviewed preventive health counseling, as reflected in patient instructions       Regular exercise       Healthy diet/nutrition       Vision screening       Pneumococcal Vaccine        Immunizations  Vaccinated for: TDAP and Declined: Pneumococcal due to Other (would like to do booster at 64)           Alcohol Use        Colorectal cancer screening       Prostate cancer screening       Advance Care Planning    He reports that he quit smoking about 19 years ago. His smoking use included cigarettes. He started smoking about 20 years ago. He has a 0.5 pack-year smoking history. He has been exposed to tobacco smoke. He has never used smokeless tobacco.            Signed Electronically by: Brenda Goss PA-C

## 2025-05-30 NOTE — NURSING NOTE
Chief Complaint   Patient presents with    Physical     CPX fasting and med refill      Pre-visit Screening:  Immunizations:  not up to date - will do tdap  Colonoscopy:  is up to date  Mammogram: na  Asthma Action Test/Plan:  na  PHQ9:  phq2 given  GAD7:  na  Questioned patient about current smoking habits Pt. quit smoking some time ago.  Ok to leave detailed message on voice mail for today's visit only yes, phone # [phone] (home)

## 2025-05-31 LAB — ABBOTT PSA - QUEST: 2.51 NG/ML

## 2025-06-03 ENCOUNTER — RESULTS FOLLOW-UP (OUTPATIENT)
Dept: FAMILY MEDICINE | Facility: CLINIC | Age: 55
End: 2025-06-03

## 2025-06-22 DIAGNOSIS — I10 ESSENTIAL HYPERTENSION: ICD-10-CM

## 2025-06-23 RX ORDER — LOSARTAN POTASSIUM AND HYDROCHLOROTHIAZIDE 12.5; 1 MG/1; MG/1
1 TABLET ORAL DAILY
COMMUNITY
Start: 2025-06-23

## 2025-06-23 NOTE — TELEPHONE ENCOUNTER
Refused Prescriptions:                       Disp   Refills    losartan-hydrochlorothiazide (HYZAAR) 100-*                Sig: TAKE 1 TABLET BY MOUTH EVERY DAY  Refused By: ENEDELIA CID  Reason for Refusal: OTHER    Pt has an appt this Friday 6-27-25  Enedelia

## 2025-07-01 ENCOUNTER — RESULTS FOLLOW-UP (OUTPATIENT)
Dept: FAMILY MEDICINE | Facility: CLINIC | Age: 55
End: 2025-07-01